# Patient Record
Sex: MALE | NOT HISPANIC OR LATINO | Employment: OTHER | URBAN - METROPOLITAN AREA
[De-identification: names, ages, dates, MRNs, and addresses within clinical notes are randomized per-mention and may not be internally consistent; named-entity substitution may affect disease eponyms.]

---

## 2017-01-20 ENCOUNTER — OFFICE VISIT (OUTPATIENT)
Dept: FAMILY MEDICINE CLINIC | Facility: CLINIC | Age: 72
End: 2017-01-20

## 2017-01-20 VITALS
WEIGHT: 206.5 LBS | SYSTOLIC BLOOD PRESSURE: 128 MMHG | BODY MASS INDEX: 31.3 KG/M2 | HEART RATE: 77 BPM | HEIGHT: 68 IN | TEMPERATURE: 97.9 F | RESPIRATION RATE: 16 BRPM | DIASTOLIC BLOOD PRESSURE: 88 MMHG | OXYGEN SATURATION: 96 %

## 2017-01-20 DIAGNOSIS — R21 RASH AND NONSPECIFIC SKIN ERUPTION: ICD-10-CM

## 2017-01-20 DIAGNOSIS — I15.0 RENOVASCULAR HYPERTENSION: ICD-10-CM

## 2017-01-20 DIAGNOSIS — R26.9 GAIT DISTURBANCE: ICD-10-CM

## 2017-01-20 DIAGNOSIS — J06.9 VIRAL URI: Primary | ICD-10-CM

## 2017-01-20 PROBLEM — I10 BENIGN ESSENTIAL HYPERTENSION: Status: RESOLVED | Noted: 2017-01-20 | Resolved: 2017-01-20

## 2017-01-20 PROBLEM — I10 BENIGN ESSENTIAL HYPERTENSION: Status: ACTIVE | Noted: 2017-01-20

## 2017-01-20 PROCEDURE — 99214 OFFICE O/P EST MOD 30 MIN: CPT | Performed by: FAMILY MEDICINE

## 2017-01-20 RX ORDER — LISINOPRIL 20 MG/1
TABLET ORAL
COMMUNITY
Start: 2017-01-17 | End: 2020-11-06

## 2017-01-20 RX ORDER — METHYLPREDNISOLONE 4 MG/1
TABLET ORAL
Qty: 21 TABLET | Refills: 0 | Status: SHIPPED | OUTPATIENT
Start: 2017-01-20 | End: 2019-04-18

## 2017-01-20 NOTE — MR AVS SNAPSHOT
Jatin Marilin   1/20/2017 11:30 AM   Office Visit    Dept Phone:  [phone]   Encounter #:  [id number]    Provider:  Glen Salas MD   Department:  [facility]                Your Full Care Plan              Today's Medication Changes          These changes are accurate as of: 1/20/17 11:55 AM.  If you have any questions, ask your nurse or doctor.               New Medication(s)Ordered:     MethylPREDNISolone 4 MG tablet   Commonly known as:  MEDROL (JENNA)   Take as directed on package instructions.   Started by:  Glen Salas MD         Medication(s)that have changed:     lisinopril 20 MG tablet   Commonly known as:  PRINIVIL,ZESTRIL   What changed:  Another medication with the same name was removed. Continue taking this medication, and follow the directions you see here.   Changed by:  Glen Salas MD            Where to Get Your Medications      These medications were sent to [address] & [facility] [phone] [phone] [place] [address]     Phone:  [phone]     MethylPREDNISolone 4 MG tablet                  Your Updated Medication List          This list is accurate as of: 1/20/17 11:55 AM.  Always use your most recent med list.                aspirin 81 MG EC tablet       CRESTOR 10 MG tablet   Generic drug:  rosuvastatin       lisinopril 20 MG tablet   Commonly known as:  PRINIVIL,ZESTRIL       MethylPREDNISolone 4 MG tablet   Commonly known as:  MEDROL (JENNA)   Take as directed on package instructions.       omeprazole 40 MG capsule   Commonly known as:  priLOSEC       ZETIA 10 MG tablet   Generic drug:  ezetimibe               You Were Diagnosed With        Codes Comments    Viral URI    -  Primary ICD-10-CM: J06.9, B97.89  ICD-9-CM: 465.9     Gait disturbance     ICD-10-CM: R26.9  ICD-9-CM: 781.2     Renovascular hypertension      "ICD-10-CM: I15.0  ICD-9-CM: 405.91       Instructions     None    Patient Instructions History      Upcoming Appointments     Visit Type Date Time Department    OFFICE VISIT 2017 11:30 AM [facility] Signup     [facility] Ve[facility] allows you to send messages to your doctor, view your test results, renew your prescriptions, schedule appointments, and more. To sign up, go to [employer] and click on the Sign Up Now link in the New User? box. Enter your Veoh Activation Code exactly as it appears below along with the last four digits of your Social Security Number and your Date of Birth () to complete the sign-up process. If you do not sign up before the expiration date, you must request a new code.    Veoh Activation Code: [email or web address]  Expires: 2/3/2017 11:55 AM    If you have questions, you can email [email or web address] or call [phone] to talk to our Veoh staff. Remember, Veoh is NOT to be used for urgent needs. For medical emergencies, dial 911.               Other Info from Your Visit           Allergies     No Known Allergies      Reason for Visit     Hypertension           Vital Signs     Blood Pressure Pulse Temperature Respirations Height Weight    128/88 77 97.9 °F (36.6 °C) (Oral) 16 68\" (172.7 cm) 206 lb 8 oz (93.7 kg)    Oxygen Saturation Body Mass Index Smoking Status             96% 31.4 kg/m2 Never Smoker         Problems and Diagnoses Noted     Heart disease due to blocked artery    Colon polyp    Difficulty swallowing    History of malignant neoplasm of prostate    High cholesterol    Renal artery stenosis    Renovascular hypertension    Viral upper respiratory tract infection    -  Primary    Abnormal walking          No Longer an Issue     Benign essential hypertension    Grief    Subdural hematoma        "

## 2017-01-20 NOTE — PROGRESS NOTES
"Subjective   Jatin Gaston is a 71 y.o. male.     Chief Complaint   Patient presents with   • Hypertension        History of Present Illness    Complaint of cold, allergy-like symptoms for last 2 or 3 days.  Sinuses.  No fever.  No cough.  No sore throat.  He states he's gotten this in the past and a Medrol Dosepak as helped.    Long history of gait disturbance.  For a few years.  He thinks after his subdural hematoma surgery.  He states that he walking down the roberto and all of a sudden he will veer to one side.  No vertigo.  No troubles with coordination.  No dropping things.  No focal weakness.  No muscle stiffness.  No confusion.    His wife passed away almost 2 years ago.  Patient has sadness but no major depression.  No anhedonia.  He has a tractor he recently bought is looking forward to working in the yard.    Rash.  For year or 2.  Anterior chest and back.  Worse with the hot shower.  Itchy at times.  He thinks it is related to stress.  Worse summer and winter.  Especially in the summer with sweat.    The following portions of the patient's history were reviewed and updated as appropriate: allergies, current medications, past family history, past medical history, past social history, past surgical history and problem list.          Review of Systems   Constitutional: Negative for activity change and appetite change.   HENT: Positive for congestion. Negative for sore throat.    Respiratory: Negative for chest tightness and shortness of breath.    Cardiovascular: Negative for chest pain, palpitations and leg swelling.   Skin: Positive for rash.   Neurological: Negative for dizziness, tremors, facial asymmetry, weakness, numbness and headaches.   Psychiatric/Behavioral: Negative for confusion and dysphoric mood.       Objective   Blood pressure 128/88, pulse 77, temperature 97.9 °F (36.6 °C), temperature source Oral, resp. rate 16, height 68\" (172.7 cm), weight 206 lb 8 oz (93.7 kg), SpO2 96 %.  Physical Exam "   Constitutional: He appears well-developed and well-nourished. No distress.   No acute distress.  Nontoxic.   HENT:   Right Ear: Tympanic membrane, external ear and ear canal normal.   Left Ear: Tympanic membrane, external ear and ear canal normal.   Nose: Nose normal.   Mouth/Throat: Oropharynx is clear and moist. No oropharyngeal exudate.   Eyes: Conjunctivae are normal. Right eye exhibits no discharge. Left eye exhibits no discharge. No scleral icterus.   Neck: No thyromegaly present.   Cardiovascular: Normal rate, regular rhythm, normal heart sounds and intact distal pulses.    Pulmonary/Chest: Effort normal and breath sounds normal. No stridor. No respiratory distress. He has no wheezes. He has no rales.   No tachypnea   Musculoskeletal: He exhibits no edema.   Lymphadenopathy:     He has no cervical adenopathy.   Neurological:   Neurological exam.  Pupils equal and reactive to light.  Extra ocular muscle movements intact all directions.  Face symmetric.  Gait unremarkable.  No ataxia.  No dysmetria.  No nystagmus     Skin: Skin is warm and dry. No rash noted.   On the anterior chest and back there is a confluent macular erythematous nondescript rash.  No folliculitis.  No scaling.  No excoriations.  No hives.   Psychiatric: He has a normal mood and affect. His behavior is normal. Judgment and thought content normal.   Nursing note and vitals reviewed.      Assessment/Plan   Jatin was seen today for hypertension.    Diagnoses and all orders for this visit:    Viral URI    Gait disturbance    Renovascular hypertension    Rash and nonspecific skin eruption    Other orders  -     MethylPREDNISolone (MEDROL, JENNA,) 4 MG tablet; Take as directed on package instructions.      Viral URI.  He's responded well in the past of Medrol Dosepak for sinus inflammation.  Refill of this was given ×1.  He will call with fever worsening symptoms.    Gait disturbance.  Nonspecific.  No evidence of Parkinson's.  No evidence of  stroke.  No evidence of vertigo.  Possibly functional, related to his osteoarthritis symptoms.  At this time recommend observation.  If the going on for some months if not years.  He will call with worsening symptoms.    Rash.  Nonspecific.  Possible xerosis.  Possible drug eruption.  It's is chronic.  If the steroid Dosepak helps, patient will call us.  If not improving he will call I will refer him to a dermatologist.    Renovascular hypertension hyperlipidemia.  Also followed by cardiology in lipid clinic.    Follow-up within 1 year for Medicare annual wellness visit.  We'll need Prevnar next visit    Patient's wife  almost 2 years ago.  He continues to grieve.  No major depression.  Minimal alcohol use.

## 2017-01-23 DIAGNOSIS — R21 RASH: Primary | ICD-10-CM

## 2017-03-21 ENCOUNTER — TELEPHONE (OUTPATIENT)
Dept: FAMILY MEDICINE CLINIC | Facility: CLINIC | Age: 72
End: 2017-03-21

## 2018-04-18 ENCOUNTER — OFFICE VISIT (OUTPATIENT)
Dept: FAMILY MEDICINE CLINIC | Facility: CLINIC | Age: 73
End: 2018-04-18

## 2018-04-18 VITALS
OXYGEN SATURATION: 95 % | DIASTOLIC BLOOD PRESSURE: 80 MMHG | SYSTOLIC BLOOD PRESSURE: 124 MMHG | HEART RATE: 65 BPM | WEIGHT: 207 LBS | BODY MASS INDEX: 31.47 KG/M2 | TEMPERATURE: 97.9 F

## 2018-04-18 DIAGNOSIS — R26.9 GAIT DISTURBANCE: ICD-10-CM

## 2018-04-18 DIAGNOSIS — E78.00 PURE HYPERCHOLESTEROLEMIA: Primary | ICD-10-CM

## 2018-04-18 DIAGNOSIS — Z85.46 HISTORY OF MALIGNANT NEOPLASM OF PROSTATE: ICD-10-CM

## 2018-04-18 DIAGNOSIS — I15.0 RENOVASCULAR HYPERTENSION: ICD-10-CM

## 2018-04-18 DIAGNOSIS — K21.00 GASTROESOPHAGEAL REFLUX DISEASE WITH ESOPHAGITIS: ICD-10-CM

## 2018-04-18 PROCEDURE — 99214 OFFICE O/P EST MOD 30 MIN: CPT | Performed by: FAMILY MEDICINE

## 2018-04-18 RX ORDER — MULTIVITAMIN
1 CAPSULE ORAL
COMMUNITY

## 2018-04-18 RX ORDER — NIACINAMIDE, ADENOSINE 5; .1 G/250ML; G/250ML
1 LIQUID TOPICAL
COMMUNITY

## 2018-04-18 RX ORDER — CHLORAL HYDRATE 500 MG
2 CAPSULE ORAL
COMMUNITY

## 2018-04-18 NOTE — PROGRESS NOTES
"Subjective   Jatin Gaston is a 72 y.o. male.     Chief Complaint   Patient presents with   • Balance Issues     stiff all over pt wants psa test        History of Present Illness    Very pleasant 72-year-old male.  I see very infrequently.  He resents today with a number of issues.  He is followed by his cardiologist fairly routinely through [facility].  Hyperlipidemia, hypertension, and reported coronary artery disease.  He continues on the medications prescribed by them.    Long-standing gait disturbance.  He feels like his balance is \"off\".  He has no tremors.  No vertigo.  A friend of his takes meclizine he was wondering if that will work.  He has not fallen.  He has no trouble moving fast.    Prostate cancer.  His urologist reportedly released him from care.  Diagnosed 2011 status post prostatectomy.  He is due for a PSA screening.    GERD.  He continues 40 mg omeprazole.  Followed by GI at [facility].  Patient wanted me to take over care.      The following portions of the patient's history were reviewed and updated as appropriate: allergies, current medications, past family history, past medical history, past social history, past surgical history and problem list.          Review of Systems   Constitutional: Negative.    Respiratory: Negative.    Cardiovascular: Negative.    Musculoskeletal: Negative.    Neurological: Negative for dizziness, light-headedness and headaches.       Objective   Blood pressure 124/80, pulse 65, temperature 97.9 °F (36.6 °C), temperature source Oral, weight 93.9 kg (207 lb), SpO2 95 %.  Physical Exam   Constitutional: He is oriented to person, place, and time. He appears well-developed and well-nourished. No distress.   No acute distress.  Nontoxic.   HENT:   Right Ear: Tympanic membrane, external ear and ear canal normal.   Left Ear: Tympanic membrane, external ear and ear canal normal.   Nose: Nose normal.   Mouth/Throat: Oropharynx is clear and moist. No oropharyngeal exudate.   Eyes: " Conjunctivae are normal. Right eye exhibits no discharge. Left eye exhibits no discharge. No scleral icterus.   Neck: No thyromegaly present.   Cardiovascular: Normal rate, regular rhythm, normal heart sounds and intact distal pulses.    Pulmonary/Chest: Effort normal and breath sounds normal. No stridor. No respiratory distress. He has no wheezes. He has no rales.   No tachypnea   Musculoskeletal: He exhibits no edema.   Lymphadenopathy:     He has no cervical adenopathy.   Neurological: He is alert and oriented to person, place, and time. No cranial nerve deficit. He exhibits normal muscle tone. Coordination normal.   Strength is 5 out of 5 upper and lower extremities.  Gait is overall unremarkable.  No dyskinesia.  No tremor.  No cogwheeling.   Skin: Skin is warm and dry. No rash noted.   Psychiatric: He has a normal mood and affect. His behavior is normal. Judgment and thought content normal.   Nursing note and vitals reviewed.      Assessment/Plan   Jatin was seen today for balance issues.    Diagnoses and all orders for this visit:    Pure hypercholesterolemia    Renovascular hypertension    History of malignant neoplasm of prostate  -     PSA DIAGNOSTIC; Future    Gastroesophageal reflux disease with esophagitis    Gait disturbance      Gait disturbance.  I think this is likely functional and related to musculoskeletal issues and deconditioning.  There is no obvious Parkinson's issue.  It's unchanged since last year.  Recommend increasing exercise and we will watch.    Hypertension.  Stable.    Hyperlipidemia.  I reviewed the [facility] records.  Cholesterol stable.    Remote history prostate cancer.  Diagnostic PSA ordered today.  He has been released from his urologist.  Next    GERD.  I recommend he continue to see his gastroenterologist.  They want to do repeat endoscopy.    Possible depression.  Patient describes decreased interest in pleasurable activities and some depressed mood at times.  His wife   3 years ago.  Lives alone with no family.  I'll see him back in 3 months for recheck including wellness visit.

## 2018-04-19 LAB — PSA SERPL-MCNC: <0.014 NG/ML (ref 0–4)

## 2019-04-18 ENCOUNTER — OFFICE VISIT (OUTPATIENT)
Dept: FAMILY MEDICINE CLINIC | Facility: CLINIC | Age: 74
End: 2019-04-18

## 2019-04-18 VITALS
HEART RATE: 78 BPM | OXYGEN SATURATION: 96 % | BODY MASS INDEX: 31.2 KG/M2 | TEMPERATURE: 97.4 F | DIASTOLIC BLOOD PRESSURE: 82 MMHG | SYSTOLIC BLOOD PRESSURE: 122 MMHG | WEIGHT: 205.9 LBS | HEIGHT: 68 IN

## 2019-04-18 DIAGNOSIS — I15.0 RENOVASCULAR HYPERTENSION: ICD-10-CM

## 2019-04-18 DIAGNOSIS — I70.1 RENAL ARTERY STENOSIS (HCC): ICD-10-CM

## 2019-04-18 DIAGNOSIS — Z00.00 MEDICARE ANNUAL WELLNESS VISIT, SUBSEQUENT: Primary | ICD-10-CM

## 2019-04-18 DIAGNOSIS — Z85.46 HISTORY OF MALIGNANT NEOPLASM OF PROSTATE: ICD-10-CM

## 2019-04-18 DIAGNOSIS — E78.00 PURE HYPERCHOLESTEROLEMIA: ICD-10-CM

## 2019-04-18 DIAGNOSIS — F32.1 CURRENT MODERATE EPISODE OF MAJOR DEPRESSIVE DISORDER, UNSPECIFIED WHETHER RECURRENT (HCC): ICD-10-CM

## 2019-04-18 DIAGNOSIS — I25.10 CHRONIC CORONARY ARTERY DISEASE: ICD-10-CM

## 2019-04-18 LAB — PSA SERPL-MCNC: <0.014 NG/ML (ref 0–4)

## 2019-04-18 PROCEDURE — G0439 PPPS, SUBSEQ VISIT: HCPCS | Performed by: FAMILY MEDICINE

## 2019-04-18 PROCEDURE — 90670 PCV13 VACCINE IM: CPT | Performed by: FAMILY MEDICINE

## 2019-04-18 PROCEDURE — G0009 ADMIN PNEUMOCOCCAL VACCINE: HCPCS | Performed by: FAMILY MEDICINE

## 2019-04-18 PROCEDURE — 99214 OFFICE O/P EST MOD 30 MIN: CPT | Performed by: FAMILY MEDICINE

## 2019-04-18 RX ORDER — AMLODIPINE BESYLATE 5 MG/1
5 TABLET ORAL DAILY
COMMUNITY
Start: 2019-01-18 | End: 2021-02-12 | Stop reason: SDUPTHER

## 2019-04-18 RX ORDER — FLUOXETINE 10 MG/1
10 CAPSULE ORAL DAILY
Qty: 30 CAPSULE | Refills: 5 | Status: SHIPPED | OUTPATIENT
Start: 2019-04-18 | End: 2019-05-30

## 2019-04-18 NOTE — PROGRESS NOTES
QUICK REFERENCE INFORMATION:  The ABCs of the Annual Wellness Visit    Subsequent Medicare Wellness Visit     HEALTH RISK ASSESSMENT    : 1945    Recent Hospitalizations:  No hospitalization(s) within the last year..  ccc      Current Medical Providers:  Patient Care Team:  Glen aSlas MD as PCP - General (Family Medicine)  Aldo Tobin MD as PCP - Claims Attributed        Smoking Status:  Social History     Tobacco Use   Smoking Status Never Smoker   Smokeless Tobacco Former User       Alcohol Consumption:  Social History     Substance and Sexual Activity   Alcohol Use Yes    Comment: occasional       Depression Screen:   PHQ-2/PHQ-9 Depression Screening 2019   Little interest or pleasure in doing things 3   Feeling down, depressed, or hopeless 3   Trouble falling or staying asleep, or sleeping too much 3   Feeling tired or having little energy 3   Poor appetite or overeating 3   Feeling bad about yourself - or that you are a failure or have let yourself or your family down 0   Trouble concentrating on things, such as reading the newspaper or watching television 0   Moving or speaking so slowly that other people could have noticed. Or the opposite - being so fidgety or restless that you have been moving around a lot more than usual 0   Thoughts that you would be better off dead, or of hurting yourself in some way 0   Total Score 15   If you checked off any problems, how difficult have these problems made it for you to do your work, take care of things at home, or get along with other people? Not difficult at all       Health Habits and Functional and Cognitive Screening:  Functional & Cognitive Status 2019   Do you have difficulty preparing food and eating? No   Do you have difficulty bathing yourself, getting dressed or grooming yourself? No   Do you have difficulty using the toilet? No   Do you have difficulty moving around from place to place? Yes   Do you have trouble with  steps or getting out of a bed or a chair? No   In the past year have you fallen or experienced a near fall? No   Current Diet Unhealthy Diet   Dental Exam Up to date   Eye Exam Not up to date   Exercise (times per week) 0 times per week   Current Exercise Activities Include None   Do you need help using the phone?  No   Are you deaf or do you have serious difficulty hearing?  No   Do you need help with transportation? No   Do you need help shopping? No   Do you need help preparing meals?  No   Do you need help with housework?  No   Do you need help with laundry? No   Do you need help taking your medications? No   Do you need help managing money? No   Do you ever drive or ride in a car without wearing a seat belt? No   Have you felt unusual stress, anger or loneliness in the last month? Yes   Who do you live with? Alone   If you need help, do you have trouble finding someone available to you? Yes   Have you been bothered in the last four weeks by sexual problems? No   Do you have difficulty concentrating, remembering or making decisions? No           Does the patient have evidence of cognitive impairment? No    Asiprin use counseling: Taking ASA appropriately as indicated      Recent Lab Results:                   Age-appropriate Screening Schedule:  Refer to the list below for future screening recommendations based on patient's age, sex and/or medical conditions. Orders for these recommended tests are listed in the plan section. The patient has been provided with a written plan.    Health Maintenance   Topic Date Due   • TDAP/TD VACCINES (1 - Tdap) 12/21/1966   • ZOSTER VACCINE (1 of 2) 12/21/1997   • COLONOSCOPY  03/11/2016   • PNEUMOCOCCAL VACCINES (65+ LOW/MEDIUM RISK) (2 of 2 - PCV13) 03/11/2017   • INFLUENZA VACCINE  08/01/2019   • LIPID PANEL  08/20/2019        Subjective   History of Present Illness    Jatin Gaston is a 73 y.o. male who presents for an Annual Wellness Visit.    The following portions of the  "patient's history were reviewed and updated as appropriate: allergies, current medications, past family history, past medical history, past social history, past surgical history and problem list.    Outpatient Medications Prior to Visit   Medication Sig Dispense Refill   • amLODIPine (NORVASC) 5 MG tablet Take 5 mg by mouth Daily.     • aspirin 81 MG EC tablet Take 81 mg by mouth daily.     • Coenzyme Q10 (COQ-10) 150 MG capsule Take 1 capsule by mouth.     • CRESTOR 10 MG tablet      • lisinopril (PRINIVIL,ZESTRIL) 20 MG tablet      • Multiple Vitamin (MULTIVITAMIN) capsule Take 1 capsule by mouth.     • Omega-3 Fatty Acids (FISH OIL) 1000 MG capsule capsule Take 2 capsules by mouth.     • omeprazole (PriLOSEC) 40 MG capsule      • ZETIA 10 MG tablet      • MethylPREDNISolone (MEDROL, JENNA,) 4 MG tablet Take as directed on package instructions. 21 tablet 0     No facility-administered medications prior to visit.        Patient Active Problem List   Diagnosis   • Renovascular hypertension   • Gastroesophageal reflux disease with esophagitis   • Pure hypercholesterolemia   • Colon polyp   • Dysphagia   • Chronic coronary artery disease   • History of malignant neoplasm of prostate   • Renal artery stenosis (CMS/HCC)       Advance Care Planning:  Patient has an advance directive - a copy has not been provided. Have asked the patient to send this to us to add to record    Identification of Risk Factors:  Risk factors include: cardiovascular risk.  Depression.    Review of Systems    Compared to one year ago, the patient feels his physical health is the same.  Compared to one year ago, the patient feels his mental health is the same.    Objective     Physical Exam    Vitals:    04/18/19 1015   BP: 122/82   Pulse: 78   Temp: 97.4 °F (36.3 °C)   TempSrc: Oral   SpO2: 96%   Weight: 93.4 kg (205 lb 14.4 oz)   Height: 172.7 cm (67.99\")       Patient's Body mass index is 31.31 kg/m². BMI is above normal parameters. " Recommendations include: exercise counseling.      Assessment/Plan   Patient Self-Management and Personalized Health Advice  The patient has been provided with information about: exercise, designing advance directives and mental health concerns and preventive services including:   · Advance directive, Pneumococcal vaccine .    Visit Diagnoses:    ICD-10-CM ICD-9-CM   1. Medicare annual wellness visit, subsequent Z00.00 V70.0   2. Renal artery stenosis (CMS/HCC) I70.1 440.1   3. Renovascular hypertension I15.0 405.91   4. Pure hypercholesterolemia E78.00 272.0       No orders of the defined types were placed in this encounter.      Outpatient Encounter Medications as of 4/18/2019   Medication Sig Dispense Refill   • amLODIPine (NORVASC) 5 MG tablet Take 5 mg by mouth Daily.     • aspirin 81 MG EC tablet Take 81 mg by mouth daily.     • Coenzyme Q10 (COQ-10) 150 MG capsule Take 1 capsule by mouth.     • CRESTOR 10 MG tablet      • lisinopril (PRINIVIL,ZESTRIL) 20 MG tablet      • Multiple Vitamin (MULTIVITAMIN) capsule Take 1 capsule by mouth.     • Omega-3 Fatty Acids (FISH OIL) 1000 MG capsule capsule Take 2 capsules by mouth.     • omeprazole (PriLOSEC) 40 MG capsule      • ZETIA 10 MG tablet      • [DISCONTINUED] MethylPREDNISolone (MEDROL, JENNA,) 4 MG tablet Take as directed on package instructions. 21 tablet 0     No facility-administered encounter medications on file as of 4/18/2019.        Reviewed use of high risk medication in the elderly: yes  Reviewed for potential of harmful drug interactions in the elderly: yes    Follow Up:  No Follow-up on file.     An After Visit Summary and PPPS with all of these plans were given to the patient.

## 2019-04-18 NOTE — PROGRESS NOTES
"Subjective   Jatin Gaston is a 73 y.o. male.     Chief Complaint   Patient presents with   • Medicare Wellness-subsequent     pt is not fasting   • Depression        History of Present Illness    Depression.  Symptoms for a number of months.  PHQ 9 score 13 today.  Mild to moderate symptoms.  No suicidal ideation.  Patient states that depression and suicide runs in the family.  He is  now about 3 or 4 years.  Wakes up every morning missing his wife.  Otherwise he is active.  He enjoys being with his cats.  He states he does not want to hurt himself or kill himself.  He is interested in treatment.    Renal vascular hypertension with renal artery stenosis.  Previously treated.  He continues amlodipine and lisinopril prescribed by his cardiologist.  Next    Hyperlipidemia.  He continues Crestor and Zetia.    Chronic coronary artery disease.  Followed by cardiology.      The following portions of the patient's history were reviewed and updated as appropriate: allergies, current medications, past family history, past medical history, past social history, past surgical history and problem list.          Review of Systems   Constitutional: Negative.    Respiratory: Negative.    Cardiovascular: Negative.    Musculoskeletal: Negative.    Psychiatric/Behavioral: Positive for dysphoric mood. Negative for suicidal ideas.       Objective   Blood pressure 122/82, pulse 78, temperature 97.4 °F (36.3 °C), temperature source Oral, height 172.7 cm (67.99\"), weight 93.4 kg (205 lb 14.4 oz), SpO2 96 %.  Physical Exam   Constitutional: He appears well-developed and well-nourished. No distress.   Neck: No thyromegaly present.   Cardiovascular: Normal rate, regular rhythm, normal heart sounds and intact distal pulses.   Pulmonary/Chest: Effort normal and breath sounds normal.   Musculoskeletal: He exhibits no edema.   Skin: Skin is warm and dry.   Psychiatric: Judgment and thought content normal.   Mood is depressed   Nursing note " and vitals reviewed.      Assessment/Plan   Jatin was seen today for medicare wellness-subsequent and depression.    Diagnoses and all orders for this visit:    Medicare annual wellness visit, subsequent    Renal artery stenosis (CMS/HCC)    Renovascular hypertension    Pure hypercholesterolemia    Chronic coronary artery disease    History of malignant neoplasm of prostate  -     PSA DIAGNOSTIC    Current moderate episode of major depressive disorder, unspecified whether recurrent (CMS/HCC)    Other orders  -     Pneumococcal Conjugate Vaccine 13-Valent All  -     FLUoxetine (PROzac) 10 MG capsule; Take 1 capsule by mouth Daily.      Major depression.  Counseling given.  Start fluoxetine 10 mg a day.  Side effects given.  Return in 6 weeks for follow-up.    Hypertension, history of renal artery stenosis, chronic coronary disease.  Also followed by cardiology.    Hyperlipidemia.  Continue Crestor and Zetia.    Prostate cancer history.  Released by urology.  They want us to follow PSA levels.  Undetected last year.  Rechecking today.  No urological symptoms.

## 2019-05-30 ENCOUNTER — OFFICE VISIT (OUTPATIENT)
Dept: FAMILY MEDICINE CLINIC | Facility: CLINIC | Age: 74
End: 2019-05-30

## 2019-05-30 VITALS
TEMPERATURE: 97.2 F | WEIGHT: 203.2 LBS | DIASTOLIC BLOOD PRESSURE: 82 MMHG | SYSTOLIC BLOOD PRESSURE: 122 MMHG | OXYGEN SATURATION: 96 % | BODY MASS INDEX: 30.8 KG/M2 | HEART RATE: 68 BPM | HEIGHT: 68 IN

## 2019-05-30 DIAGNOSIS — F32.1 CURRENT MODERATE EPISODE OF MAJOR DEPRESSIVE DISORDER, UNSPECIFIED WHETHER RECURRENT (HCC): Primary | ICD-10-CM

## 2019-05-30 PROCEDURE — 99213 OFFICE O/P EST LOW 20 MIN: CPT | Performed by: FAMILY MEDICINE

## 2019-05-30 RX ORDER — FLUOXETINE 10 MG/1
10 CAPSULE ORAL DAILY
Qty: 90 CAPSULE | Refills: 1 | Status: SHIPPED | OUTPATIENT
Start: 2019-05-30 | End: 2019-11-11 | Stop reason: SDUPTHER

## 2019-05-30 RX ORDER — POLYETHYLENE GLYCOL-3350 AND ELECTROLYTES 236; 6.74; 5.86; 2.97; 22.74 G/274.31G; G/274.31G; G/274.31G; G/274.31G; G/274.31G
POWDER, FOR SOLUTION ORAL
COMMUNITY
Start: 2019-04-18 | End: 2021-11-15

## 2019-05-30 NOTE — PROGRESS NOTES
"Subjective   Jatin Gaston is a 73 y.o. male.     Chief Complaint   Patient presents with   • Hyperlipidemia     follow up   • Hypertension   • Depression        History of Present Illness    Follow-up depression.  The PHQ 9 score is improved.  Was 13.  Now 5.  He feels medication relaxes him.  He still feels depressed at times.  He is more physically active.  He got his colonoscopy done.  Also had his EGD done.  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  His symptoms are not that difficult at this time.  No side effects from the fluoxetine 10 mg daily      The following portions of the patient's history were reviewed and updated as appropriate: allergies, current medications, past family history, past medical history, past social history, past surgical history and problem list.          Review of Systems   Constitutional: Negative.    Respiratory: Negative.    Cardiovascular: Negative.    Musculoskeletal: Negative.    Psychiatric/Behavioral: Positive for dysphoric mood.       Objective   Blood pressure 122/82, pulse 68, temperature 97.2 °F (36.2 °C), temperature source Oral, height 172.7 cm (67.99\"), weight 92.2 kg (203 lb 3.2 oz), SpO2 96 %.  Physical Exam   Constitutional: He appears well-developed and well-nourished. No distress.   Neck: No thyromegaly present.   Cardiovascular: Normal rate, regular rhythm, normal heart sounds and intact distal pulses.   Pulmonary/Chest: Effort normal and breath sounds normal.   Musculoskeletal: He exhibits no edema.   Skin: Skin is warm and dry.   Psychiatric: His behavior is normal. Judgment and thought content normal.   Affect is more bright.  Mood improved.  No suicidal ideation.   Nursing note and vitals reviewed.      Assessment/Plan   Jatin was seen today for hyperlipidemia, hypertension and depression.    Diagnoses and all orders for this visit:    Current moderate episode of major depressive disorder, unspecified whether recurrent " (CMS/[facility])    Other orders  -     FLUoxetine (PROzac) 10 MG capsule; Take 1 capsule by mouth Daily.        Major depression.  Improving.  Continue fluoxetine 10 mg a day for the next 6 to 12 months.  Will reassess.  Counseling given today.  He will continue to be physically active.  I will see him in 6 months.

## 2019-11-11 RX ORDER — FLUOXETINE 10 MG/1
CAPSULE ORAL
Qty: 90 CAPSULE | Refills: 1 | Status: SHIPPED | OUTPATIENT
Start: 2019-11-11 | End: 2020-02-05 | Stop reason: SDUPTHER

## 2020-02-05 ENCOUNTER — OFFICE VISIT (OUTPATIENT)
Dept: FAMILY MEDICINE CLINIC | Facility: CLINIC | Age: 75
End: 2020-02-05

## 2020-02-05 VITALS
WEIGHT: 207.8 LBS | SYSTOLIC BLOOD PRESSURE: 147 MMHG | TEMPERATURE: 96.9 F | OXYGEN SATURATION: 97 % | DIASTOLIC BLOOD PRESSURE: 82 MMHG | HEIGHT: 68 IN | BODY MASS INDEX: 31.49 KG/M2 | HEART RATE: 64 BPM

## 2020-02-05 DIAGNOSIS — E78.00 PURE HYPERCHOLESTEROLEMIA: Primary | ICD-10-CM

## 2020-02-05 DIAGNOSIS — I25.10 CHRONIC CORONARY ARTERY DISEASE: ICD-10-CM

## 2020-02-05 DIAGNOSIS — M10.079 IDIOPATHIC GOUT INVOLVING TOE, UNSPECIFIED CHRONICITY, UNSPECIFIED LATERALITY: ICD-10-CM

## 2020-02-05 DIAGNOSIS — F33.41 RECURRENT MAJOR DEPRESSIVE DISORDER, IN PARTIAL REMISSION (HCC): ICD-10-CM

## 2020-02-05 DIAGNOSIS — Z85.46 HISTORY OF MALIGNANT NEOPLASM OF PROSTATE: ICD-10-CM

## 2020-02-05 DIAGNOSIS — I15.0 RENOVASCULAR HYPERTENSION: ICD-10-CM

## 2020-02-05 DIAGNOSIS — I70.1 RENAL ARTERY STENOSIS (HCC): ICD-10-CM

## 2020-02-05 PROCEDURE — 99214 OFFICE O/P EST MOD 30 MIN: CPT | Performed by: FAMILY MEDICINE

## 2020-02-05 RX ORDER — FLUOXETINE 10 MG/1
10 CAPSULE ORAL DAILY
Qty: 90 CAPSULE | Refills: 1 | Status: SHIPPED | OUTPATIENT
Start: 2020-02-05 | End: 2020-07-24

## 2020-02-05 NOTE — PROGRESS NOTES
"Subjective   Jatin Gaston is a 74 y.o. male.     Depression (pt is fasting)    History of Present Illness    Hypertension follow up. Doing well with current medication which he is taking as directed. No known high or low blood pressure episodes. No cardiovascular or neurological symptoms. Today's BP: 147/82.      Chronic coronary artery disease.  Asymptomatic.  Continues aspirin.  Followed by cardiology.    Hyperlipidemia follow up. He is taking statin medication without complaint. No myopathy symptoms.  Continues Crestor and Zetia.  We have no lipid panel on file.  Prescribed by cardiology.    Major depression.  In remission mostly.  PHQ 9 score of 5, not difficult, natalie 7 score of 0.  Continues fluoxetine 10 mg day without side effect.  He would like a refill.    Patient believes he had a flare of gout.  Right great toe.  He was moving a lot of wood and doing other work and then that morning it started hurting the next day.  Redness.  He laid off it.  Got better.  This is happened before.  Maybe once or twice a year.    History of prostate cancer.  He is coming due for his diagnostic PSA level.    The following portions of the patient's history were reviewed and updated as appropriate: allergies, current medications, past family history, past medical history, past social history, past surgical history and problem list.      Review of Systems   Constitutional: Negative.    Respiratory: Negative.    Cardiovascular: Negative.    Musculoskeletal: Positive for joint swelling.       Objective   Blood pressure 147/82, pulse 64, temperature 96.9 °F (36.1 °C), temperature source Oral, height 172.7 cm (67.99\"), weight 94.3 kg (207 lb 12.8 oz), SpO2 97 %.  Physical Exam   Constitutional: He appears well-developed and well-nourished. No distress.   Neck: No thyromegaly present.   Cardiovascular: Normal rate, regular rhythm, normal heart sounds and intact distal pulses.   Pulmonary/Chest: Effort normal and breath sounds normal. "   Abdominal: Soft. Bowel sounds are normal. He exhibits no distension and no mass. There is no tenderness. There is no guarding.   Abdomen is obese.  History of vascular disease, coronary disease and renal artery stenosis.  Never smoker.  No family history abdominal aortic aneurysm.  Limited point-of-care ultrasound of the abdominal aorta was performed by me.  The proximal aorta was difficult to visualize due to overlying bowel gas.  Distal aorta visualized to the epigastrium.  Transverse diameter 2.03 cm longitudinal diameter 2.06 cm.  No evidence of aneurysmal dilatation distal aorta.  At this time no absolute indication for formal study.  Does not meet Medicare criteria.   Musculoskeletal: He exhibits no edema.   Skin: Skin is warm and dry.   Psychiatric: He has a normal mood and affect. His behavior is normal. Judgment and thought content normal.   Nursing note and vitals reviewed.      Assessment/Plan   Jatin was seen today for depression.    Diagnoses and all orders for this visit:    Pure hypercholesterolemia  -     Comprehensive Metabolic Panel  -     Lipid Panel    Renovascular hypertension  -     Comprehensive Metabolic Panel    Recurrent major depressive disorder, in partial remission (CMS/HCC)    Renal artery stenosis (CMS/HCC)    History of malignant neoplasm of prostate  -     PSA DIAGNOSTIC    Idiopathic gout involving toe, unspecified chronicity, unspecified laterality  -     Uric acid    Chronic coronary artery disease    Other orders  -     FLUoxetine (PROzac) 10 MG capsule; Take 1 capsule by mouth Daily.      Hyperlipidemia.  Known coronary disease.  Continue Zetia and Crestor.  Checking labs today.    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  2003.  Left kidney.  Blood pressures running well.  Continue current medication.    Major depression.  Mostly in remission.  Continue fluoxetine.  Refill given.  Counseling given.  See me in 6 months recheck and annual wellness  visit.    History of prostate cancer.  Rechecking diagnostic PSA.    Gout.  Likely reason exacerbation.  Asymptomatic now.  Once or twice a year.  Holding off allopurinol.  He will see us with recurrent symptoms.  Checking uric acid today.

## 2020-02-06 LAB
ALBUMIN SERPL-MCNC: 4.5 G/DL (ref 3.5–5.2)
ALBUMIN/GLOB SERPL: 2 G/DL
ALP SERPL-CCNC: 82 U/L (ref 39–117)
ALT SERPL-CCNC: 17 U/L (ref 1–41)
AST SERPL-CCNC: 22 U/L (ref 1–40)
BILIRUB SERPL-MCNC: 0.4 MG/DL (ref 0.2–1.2)
BUN SERPL-MCNC: 16 MG/DL (ref 8–23)
BUN/CREAT SERPL: 11.4 (ref 7–25)
CALCIUM SERPL-MCNC: 9.1 MG/DL (ref 8.6–10.5)
CHLORIDE SERPL-SCNC: 102 MMOL/L (ref 98–107)
CHOLEST SERPL-MCNC: 142 MG/DL (ref 0–200)
CO2 SERPL-SCNC: 25.8 MMOL/L (ref 22–29)
CREAT SERPL-MCNC: 1.4 MG/DL (ref 0.76–1.27)
GLOBULIN SER CALC-MCNC: 2.2 GM/DL
GLUCOSE SERPL-MCNC: 97 MG/DL (ref 65–99)
HDLC SERPL-MCNC: 42 MG/DL (ref 40–60)
LDLC SERPL CALC-MCNC: 67 MG/DL (ref 0–100)
POTASSIUM SERPL-SCNC: 4.5 MMOL/L (ref 3.5–5.2)
PROT SERPL-MCNC: 6.7 G/DL (ref 6–8.5)
PSA SERPL-MCNC: <0.014 NG/ML (ref 0–4)
SODIUM SERPL-SCNC: 141 MMOL/L (ref 136–145)
TRIGL SERPL-MCNC: 165 MG/DL (ref 0–150)
URATE SERPL-MCNC: 6.5 MG/DL (ref 3.4–7)
VLDLC SERPL CALC-MCNC: 33 MG/DL

## 2020-02-06 RX ORDER — ALLOPURINOL 100 MG/1
100 TABLET ORAL DAILY
Qty: 30 TABLET | Refills: 0 | Status: SHIPPED | OUTPATIENT
Start: 2020-02-06

## 2020-02-06 NOTE — PROGRESS NOTES
The lab work overall looks good.  The PSA is not detected.  The uric acid level is on the high normal side, consistent consistent with his recent symptoms of gout.  If he has recurrent gout he is going to let us know.  Cholesterol looks good. fine.

## 2020-07-24 RX ORDER — FLUOXETINE 10 MG/1
CAPSULE ORAL
Qty: 90 CAPSULE | Refills: 1 | Status: SHIPPED | OUTPATIENT
Start: 2020-07-24 | End: 2020-08-07

## 2020-08-04 DIAGNOSIS — I15.0 RENOVASCULAR HYPERTENSION: Primary | ICD-10-CM

## 2020-08-04 DIAGNOSIS — E78.00 PURE HYPERCHOLESTEROLEMIA: ICD-10-CM

## 2020-08-05 ENCOUNTER — LAB (OUTPATIENT)
Dept: LAB | Facility: HOSPITAL | Age: 75
End: 2020-08-05

## 2020-08-05 LAB
ALBUMIN SERPL-MCNC: 4.3 G/DL (ref 3.5–5.2)
ALBUMIN/GLOB SERPL: 1.9 G/DL
ALP SERPL-CCNC: 74 U/L (ref 39–117)
ALT SERPL W P-5'-P-CCNC: 21 U/L (ref 1–41)
ANION GAP SERPL CALCULATED.3IONS-SCNC: 7.9 MMOL/L (ref 5–15)
AST SERPL-CCNC: 20 U/L (ref 1–40)
BILIRUB SERPL-MCNC: 0.4 MG/DL (ref 0–1.2)
BUN SERPL-MCNC: 18 MG/DL (ref 8–23)
BUN/CREAT SERPL: 11 (ref 7–25)
CALCIUM SPEC-SCNC: 9.2 MG/DL (ref 8.6–10.5)
CHLORIDE SERPL-SCNC: 105 MMOL/L (ref 98–107)
CHOLEST SERPL-MCNC: 137 MG/DL (ref 0–200)
CO2 SERPL-SCNC: 28.1 MMOL/L (ref 22–29)
CREAT SERPL-MCNC: 1.64 MG/DL (ref 0.76–1.27)
GFR SERPL CREATININE-BSD FRML MDRD: 42 ML/MIN/1.73
GFR SERPL CREATININE-BSD FRML MDRD: 50 ML/MIN/1.73
GLOBULIN UR ELPH-MCNC: 2.3 GM/DL
GLUCOSE SERPL-MCNC: 120 MG/DL (ref 65–99)
HDLC SERPL-MCNC: 42 MG/DL (ref 40–60)
LDLC SERPL CALC-MCNC: 65 MG/DL (ref 0–100)
LDLC/HDLC SERPL: 1.55 {RATIO}
POTASSIUM SERPL-SCNC: 4.7 MMOL/L (ref 3.5–5.2)
PROT SERPL-MCNC: 6.6 G/DL (ref 6–8.5)
SODIUM SERPL-SCNC: 141 MMOL/L (ref 136–145)
TRIGL SERPL-MCNC: 149 MG/DL (ref 0–150)
VLDLC SERPL-MCNC: 29.8 MG/DL (ref 5–40)

## 2020-08-05 PROCEDURE — 80053 COMPREHEN METABOLIC PANEL: CPT | Performed by: FAMILY MEDICINE

## 2020-08-05 PROCEDURE — 80061 LIPID PANEL: CPT | Performed by: FAMILY MEDICINE

## 2020-08-05 PROCEDURE — 36415 COLL VENOUS BLD VENIPUNCTURE: CPT | Performed by: FAMILY MEDICINE

## 2020-08-07 ENCOUNTER — OFFICE VISIT (OUTPATIENT)
Dept: FAMILY MEDICINE CLINIC | Facility: CLINIC | Age: 75
End: 2020-08-07

## 2020-08-07 VITALS
WEIGHT: 205 LBS | OXYGEN SATURATION: 95 % | BODY MASS INDEX: 31.07 KG/M2 | DIASTOLIC BLOOD PRESSURE: 86 MMHG | HEIGHT: 68 IN | SYSTOLIC BLOOD PRESSURE: 136 MMHG | TEMPERATURE: 97.1 F | HEART RATE: 76 BPM

## 2020-08-07 DIAGNOSIS — Z00.00 MEDICARE ANNUAL WELLNESS VISIT, SUBSEQUENT: Primary | ICD-10-CM

## 2020-08-07 DIAGNOSIS — E78.00 PURE HYPERCHOLESTEROLEMIA: ICD-10-CM

## 2020-08-07 DIAGNOSIS — I25.10 CHRONIC CORONARY ARTERY DISEASE: ICD-10-CM

## 2020-08-07 DIAGNOSIS — F33.41 RECURRENT MAJOR DEPRESSIVE DISORDER, IN PARTIAL REMISSION (HCC): ICD-10-CM

## 2020-08-07 DIAGNOSIS — I70.1 RENAL ARTERY STENOSIS (HCC): ICD-10-CM

## 2020-08-07 DIAGNOSIS — R73.01 IMPAIRED FASTING GLUCOSE: ICD-10-CM

## 2020-08-07 DIAGNOSIS — I15.0 RENOVASCULAR HYPERTENSION: ICD-10-CM

## 2020-08-07 LAB
BUN SERPL-MCNC: 28 MG/DL (ref 8–23)
BUN/CREAT SERPL: 17 (ref 7–25)
CALCIUM SERPL-MCNC: 9 MG/DL (ref 8.6–10.5)
CHLORIDE SERPL-SCNC: 103 MMOL/L (ref 98–107)
CO2 SERPL-SCNC: 23.7 MMOL/L (ref 22–29)
CREAT SERPL-MCNC: 1.65 MG/DL (ref 0.76–1.27)
GLUCOSE SERPL-MCNC: 125 MG/DL (ref 65–99)
HBA1C MFR BLD: 6.1 % (ref 4.8–5.6)
POTASSIUM SERPL-SCNC: 4.6 MMOL/L (ref 3.5–5.2)
SODIUM SERPL-SCNC: 140 MMOL/L (ref 136–145)

## 2020-08-07 PROCEDURE — 99214 OFFICE O/P EST MOD 30 MIN: CPT | Performed by: FAMILY MEDICINE

## 2020-08-07 PROCEDURE — G0439 PPPS, SUBSEQ VISIT: HCPCS | Performed by: FAMILY MEDICINE

## 2020-08-07 RX ORDER — FLUOXETINE HYDROCHLORIDE 20 MG/1
20 CAPSULE ORAL DAILY
Qty: 90 CAPSULE | Refills: 1 | Status: SHIPPED | OUTPATIENT
Start: 2020-08-07 | End: 2021-04-06

## 2020-08-07 NOTE — PROGRESS NOTES
"Subjective   Jatin Gaston is a 74 y.o. male.     Medicare Wellness-subsequent (AWV)    History of Present Illness       Major depression.  Exacerbation.  Stressors.  Social isolation.  Had his wife  5 years ago.  His brother-in-law  recently.  He has had decreased interest in pleasurable activities.  He is not mowing the lawn as much as he used to.  He has had some vague suicidal ideation but no specific plan.  He does take cierra in his cats.  He does take a trip down to his property in [place].    Hypertension follow up. Doing well with current medication which he is taking as directed. No known high or low blood pressure episodes. No cardiovascular or neurological symptoms. Today's BP: 136/86.      Hyperlipidemia follow up. He is taking statin medication without complaint. No myopathy symptoms.  Known coronary artery disease.    Lab Results   Component Value Date    CHOL 137 2020    CHLPL 142 2020    TRIG 149 2020    HDL 42 2020    LDL 65 2020     Renal artery stenosis left kidney.  Stent .  Atrophic left kidney.  Recent creatinine 1-1.6 with a GFR in the 40s.  He did not have much water the morning of the blood work.  His blood sugar is also elevated at 120.  He states his been eating a lot of candy lately.    The following portions of the patient's history were reviewed and updated as appropriate: allergies, current medications, past family history, past medical history, past social history, past surgical history and problem list.      Review of Systems   Constitutional: Negative.    Respiratory: Negative.    Cardiovascular: Negative.    Musculoskeletal: Negative.    Psychiatric/Behavioral: Positive for dysphoric mood. The patient is nervous/anxious.        Objective   Blood pressure 136/86, pulse 76, temperature 97.1 °F (36.2 °C), height 172.7 cm (68\"), weight 93 kg (205 lb), SpO2 95 %.  Physical Exam   Constitutional: He is oriented to person, place, and time. He " appears well-nourished. No distress.   HENT:   Head: Atraumatic.   Cardiovascular: Normal rate and regular rhythm.   Pulmonary/Chest: Effort normal and breath sounds normal.   Abdominal: Soft. He exhibits no distension. There is no tenderness.   Musculoskeletal: Normal range of motion.   Neurological: He is alert and oriented to person, place, and time. He exhibits normal muscle tone. Coordination normal.   Psychiatric:   Affect somewhat flat.  Mood depressed.     Quick look point-of-care ultrasound.  Measuring kidney size differences.  Right kidney is 8.9 cm in the longitudinal direction.  The left kidney is much smaller 4.6 centimeters in the longitudinal direction.  No hydronephrosis seen.    Assessment/Plan   Jatin was seen today for medicare wellness-subsequent.    Diagnoses and all orders for this visit:    Medicare annual wellness visit, subsequent    Renal artery stenosis (CMS/[facility])    Pure hypercholesterolemia    Chronic coronary artery disease    Renovascular hypertension    Recurrent major depressive disorder, in partial remission (CMS/[facility])    Impaired fasting glucose  -     Hemoglobin A1c  -     Basic Metabolic Panel    Other orders  -     FLUoxetine (PROzac) 20 MG capsule; Take 1 capsule by mouth Daily.      Renal artery stenosis.  Slight increase in creatinine.  Possibly from dehydration.  Rechecking today.  He has a known atrophic left kidney.    Chronic coronary disease.  No recurrent symptoms.    Hypertension.  Controlled.    Major depression.  In partial remission with recurrence.  Recommending increasing fluoxetine from 10 mg up to 20 mg daily.  Counseling given.  Patient has had some vague suicidal ideation but no definitive plan.  He agrees to contact help or suicide prevention hotline with any suicidal plan.    Impaired fasting glucose.  Checking A1c.  I will see him back in 3 months for recheck sooner as needed.  He understands to call if mood is getting worse.

## 2020-08-07 NOTE — PROGRESS NOTES
The ABCs of the Annual Wellness Visit  Subsequent Medicare Wellness Visit    Chief Complaint   Patient presents with   • Medicare Wellness-subsequent     AWV       Subjective   History of Present Illness:  Jatin Gaston is a 74 y.o. male who presents for a Subsequent Medicare Wellness Visit.    HEALTH RISK ASSESSMENT    Recent Hospitalizations:  No hospitalization(s) within the last year.    Current Medical Providers:  Patient Care Team:  Glen Salas MD as PCP - General (Family Medicine)  Glen Salas MD as PCP - Claims Attributed    Smoking Status:  Social History     Tobacco Use   Smoking Status Never Smoker   Smokeless Tobacco Former User       Alcohol Consumption:  Social History     Substance and Sexual Activity   Alcohol Use Yes    Comment: occasional       Depression Screen:   PHQ-2/PHQ-9 Depression Screening 8/7/2020   Little interest or pleasure in doing things 3   Feeling down, depressed, or hopeless 2   Trouble falling or staying asleep, or sleeping too much 0   Feeling tired or having little energy 3   Poor appetite or overeating 0   Feeling bad about yourself - or that you are a failure or have let yourself or your family down 0   Trouble concentrating on things, such as reading the newspaper or watching television 1   Moving or speaking so slowly that other people could have noticed. Or the opposite - being so fidgety or restless that you have been moving around a lot more than usual 0   Thoughts that you would be better off dead, or of hurting yourself in some way 1   Total Score 10   If you checked off any problems, how difficult have these problems made it for you to do your work, take care of things at home, or get along with other people? Very difficult       Fall Risk Screen:  STEADI Fall Risk Assessment was completed, and patient is at LOW risk for falls.Assessment completed on:8/7/2020    Health Habits and Functional and Cognitive Screening:  Functional & Cognitive Status 8/7/2020   Do  you have difficulty preparing food and eating? No   Do you have difficulty bathing yourself, getting dressed or grooming yourself? No   Do you have difficulty using the toilet? No   Do you have difficulty moving around from place to place? No   Do you have trouble with steps or getting out of a bed or a chair? No   Current Diet Frequent Junk Food   Dental Exam Not up to date   Eye Exam Not up to date   Exercise (times per week) 2 times per week   Current Exercise Activities Include Walking   Do you need help using the phone?  No   Are you deaf or do you have serious difficulty hearing?  Yes   Do you need help with transportation? No   Do you need help shopping? No   Do you need help preparing meals?  No   Do you need help with housework?  No   Do you need help with laundry? No   Do you need help taking your medications? No   Do you need help managing money? No   Do you ever drive or ride in a car without wearing a seat belt? No   Have you felt unusual stress, anger or loneliness in the last month? No   Who do you live with? Alone   If you need help, do you have trouble finding someone available to you? No   Have you been bothered in the last four weeks by sexual problems? No   Do you have difficulty concentrating, remembering or making decisions? No         Does the patient have evidence of cognitive impairment? No    Asprin use counseling:Taking ASA appropriately as indicated    Age-appropriate Screening Schedule:  Refer to the list below for future screening recommendations based on patient's age, sex and/or medical conditions. Orders for these recommended tests are listed in the plan section. The patient has been provided with a written plan.    Health Maintenance   Topic Date Due   • TDAP/TD VACCINES (1 - Tdap) 12/21/1956   • ZOSTER VACCINE (2 of 3) 05/15/2016   • INFLUENZA VACCINE  08/01/2020   • LIPID PANEL  08/05/2021   • COLONOSCOPY  05/09/2029          The following portions of the patient's history were  reviewed and updated as appropriate: allergies, current medications, past family history, past medical history, past social history, past surgical history and problem list.    Outpatient Medications Prior to Visit   Medication Sig Dispense Refill   • amLODIPine (NORVASC) 5 MG tablet Take 5 mg by mouth Daily.     • aspirin 81 MG EC tablet Take 81 mg by mouth daily.     • Cholecalciferol (D 1000) 25 MCG (1000 UT) capsule Take 1,000 Units by mouth Daily.     • Coenzyme Q10 (COQ-10) 150 MG capsule Take 1 capsule by mouth.     • CRESTOR 10 MG tablet      • FLUoxetine (PROzac) 10 MG capsule TAKE 1 CAPSULE DAILY 90 capsule 1   • GAVILYTE-G 236 g solution      • lisinopril (PRINIVIL,ZESTRIL) 20 MG tablet      • Multiple Vitamin (MULTIVITAMIN) capsule Take 1 capsule by mouth.     • Omega-3 Fatty Acids (FISH OIL) 1000 MG capsule capsule Take 2 capsules by mouth.     • omeprazole (PriLOSEC) 40 MG capsule      • ZETIA 10 MG tablet      • allopurinol (ZYLOPRIM) 100 MG tablet Take 1 tablet by mouth Daily. 30 tablet 0     No facility-administered medications prior to visit.        Patient Active Problem List   Diagnosis   • Renovascular hypertension   • Gastroesophageal reflux disease with esophagitis   • Pure hypercholesterolemia   • Colon polyp   • Dysphagia   • Chronic coronary artery disease   • History of malignant neoplasm of prostate   • Renal artery stenosis (CMS/HCC)   • Recurrent major depressive disorder, in partial remission (CMS/HCC)   • Idiopathic gout involving toe       Advanced Care Planning:  ACP discussion was held with the patient during this visit. Patient has an advance directive in EMR which is still valid.     Review of Systems    Compared to one year ago, the patient feels his physical health is the same.  Compared to one year ago, the patient feels his mental health is the same.    Reviewed chart for potential of high risk medication in the elderly: yes  Reviewed chart for potential of harmful drug  "interactions in the elderly:yes    Objective         Vitals:    08/07/20 0940   BP: 136/86   Pulse: 76   Temp: 97.1 °F (36.2 °C)   SpO2: 95%   Weight: 93 kg (205 lb)   Height: 172.7 cm (68\")       Body mass index is 31.17 kg/m².  Discussed the patient's BMI with him. The BMI Is elevated and was addressed.    Physical Exam    Lab Results   Component Value Date    TRIG 149 08/05/2020    HDL 42 08/05/2020    LDL 65 08/05/2020    VLDL 29.8 08/05/2020        Assessment/Plan   Medicare Risks and Personalized Health Plan  CMS Preventative Services Quick Reference  Advance Directive Discussion  Immunizations Discussed/Encouraged (specific immunizations; Shingrix )   Depression risk addressed    The above risks/problems have been discussed with the patient.  Pertinent information has been shared with the patient in the After Visit Summary.  Follow up plans and orders are seen below in the Assessment/Plan Section.    Diagnoses and all orders for this visit:    1. Medicare annual wellness visit, subsequent (Primary)    2. Renal artery stenosis (CMS/[facility])    3. Pure hypercholesterolemia    4. Chronic coronary artery disease    5. Renovascular hypertension    6. Recurrent major depressive disorder, in partial remission (CMS/[facility])      Follow Up:  No follow-ups on file.     An After Visit Summary and PPPS were given to the patient.             "

## 2020-08-10 DIAGNOSIS — I15.0 RENOVASCULAR HYPERTENSION: ICD-10-CM

## 2020-08-10 DIAGNOSIS — R73.01 IMPAIRED FASTING GLUCOSE: Primary | ICD-10-CM

## 2020-08-10 NOTE — PROGRESS NOTES
Please call patient.  The A1c average blood sugar test is in the prediabetic range.  Recommend decreasing the sweets if possible.  Also increased exercise.  The kidney function remains mildly diminished.  We will continue to monitor.  Needs A1c and CMP prior to next visit.

## 2020-11-02 ENCOUNTER — RESULTS ENCOUNTER (OUTPATIENT)
Dept: FAMILY MEDICINE CLINIC | Facility: CLINIC | Age: 75
End: 2020-11-02

## 2020-11-02 DIAGNOSIS — R73.01 IMPAIRED FASTING GLUCOSE: ICD-10-CM

## 2020-11-02 DIAGNOSIS — I15.0 RENOVASCULAR HYPERTENSION: ICD-10-CM

## 2020-11-04 LAB
ALBUMIN SERPL-MCNC: 4.6 G/DL (ref 3.5–5.2)
ALBUMIN/GLOB SERPL: 2.6 G/DL
ALP SERPL-CCNC: 84 U/L (ref 39–117)
ALT SERPL-CCNC: 21 U/L (ref 1–41)
AST SERPL-CCNC: 21 U/L (ref 1–40)
BILIRUB SERPL-MCNC: 0.4 MG/DL (ref 0–1.2)
BUN SERPL-MCNC: 16 MG/DL (ref 8–23)
BUN/CREAT SERPL: 9.8 (ref 7–25)
CALCIUM SERPL-MCNC: 9.4 MG/DL (ref 8.6–10.5)
CHLORIDE SERPL-SCNC: 101 MMOL/L (ref 98–107)
CO2 SERPL-SCNC: 29.8 MMOL/L (ref 22–29)
CREAT SERPL-MCNC: 1.63 MG/DL (ref 0.76–1.27)
GLOBULIN SER CALC-MCNC: 1.8 GM/DL
GLUCOSE SERPL-MCNC: 106 MG/DL (ref 65–99)
HBA1C MFR BLD: 5.8 % (ref 4.8–5.6)
POTASSIUM SERPL-SCNC: 4.7 MMOL/L (ref 3.5–5.2)
PROT SERPL-MCNC: 6.4 G/DL (ref 6–8.5)
SODIUM SERPL-SCNC: 138 MMOL/L (ref 136–145)

## 2020-11-04 NOTE — PROGRESS NOTES
The A1c average glucose level is slightly improved.  The kidney function is unchanged compared to earlier this year.  We will discuss this and more at upcoming visit.

## 2020-11-06 ENCOUNTER — OFFICE VISIT (OUTPATIENT)
Dept: FAMILY MEDICINE CLINIC | Facility: CLINIC | Age: 75
End: 2020-11-06

## 2020-11-06 VITALS
HEIGHT: 68 IN | WEIGHT: 201.3 LBS | TEMPERATURE: 97.5 F | HEART RATE: 80 BPM | DIASTOLIC BLOOD PRESSURE: 89 MMHG | SYSTOLIC BLOOD PRESSURE: 143 MMHG | OXYGEN SATURATION: 97 % | BODY MASS INDEX: 30.51 KG/M2

## 2020-11-06 DIAGNOSIS — F33.41 RECURRENT MAJOR DEPRESSIVE DISORDER, IN PARTIAL REMISSION (HCC): ICD-10-CM

## 2020-11-06 DIAGNOSIS — N18.31 STAGE 3A CHRONIC KIDNEY DISEASE (HCC): Primary | ICD-10-CM

## 2020-11-06 DIAGNOSIS — I15.0 RENOVASCULAR HYPERTENSION: ICD-10-CM

## 2020-11-06 DIAGNOSIS — I70.1 RENAL ARTERY STENOSIS (HCC): ICD-10-CM

## 2020-11-06 DIAGNOSIS — E78.00 PURE HYPERCHOLESTEROLEMIA: ICD-10-CM

## 2020-11-06 DIAGNOSIS — R73.01 IMPAIRED FASTING GLUCOSE: ICD-10-CM

## 2020-11-06 PROCEDURE — 99214 OFFICE O/P EST MOD 30 MIN: CPT | Performed by: FAMILY MEDICINE

## 2020-11-06 RX ORDER — LISINOPRIL 10 MG/1
10 TABLET ORAL DAILY
Qty: 90 TABLET | Refills: 1 | Status: SHIPPED | OUTPATIENT
Start: 2020-11-06 | End: 2021-05-12 | Stop reason: SDUPTHER

## 2020-11-06 RX ORDER — FAMOTIDINE 20 MG/1
20 TABLET, FILM COATED ORAL 2 TIMES DAILY
Qty: 180 TABLET | Refills: 1 | Status: SHIPPED | OUTPATIENT
Start: 2020-11-06

## 2020-11-06 NOTE — PROGRESS NOTES
"Subjective   Jatin Gaston is a 74 y.o. male.     Hypertension (follow up )    History of Present Illness     Elevated creatinine.  At about 1.6 and stable for the last 6 months.  He is taking lisinopril 20 mg a.  Is been on omeprazole 40 mg a day for about a year.  Previously on 20 mg a day.  His GI doctor increased it last year.  He is not sure why.  No history of Nogueira's esophagitis known.  He states he does not take his omeprazole he gets heartburn symptoms that bother him.  Atrophic left kidney.  Left kidney infarction about 20 years ago.  Had a stent done.    Depression.  Stable.  Since increasing the fluoxetine last visit he states his anxiety is better.    Hypertension follow up. Doing well with current medication which he is taking as directed. No known high or low blood pressure episodes. No cardiovascular or neurological symptoms. Today's BP: 143/89.      Hyperlipidemia follow up. He is taking statin medication without complaint. No myopathy symptoms.     Lab Results   Component Value Date    CHOL 137 08/05/2020    CHLPL 142 02/05/2020    TRIG 149 08/05/2020    HDL 42 08/05/2020    LDL 65 08/05/2020       The following portions of the patient's history were reviewed and updated as appropriate: allergies, current medications, past family history, past medical history, past social history, past surgical history and problem list.      Review of Systems   Constitutional: Negative.    Respiratory: Negative.    Cardiovascular: Negative.    Musculoskeletal: Negative.        Objective   Blood pressure 143/89, pulse 80, temperature 97.5 °F (36.4 °C), temperature source Temporal, height 172.7 cm (67.99\"), weight 91.3 kg (201 lb 4.8 oz), SpO2 97 %.  Physical Exam    Assessment/Plan   Diagnoses and all orders for this visit:    1. Stage 3a chronic kidney disease (Primary)  -     Comprehensive Metabolic Panel; Future    2. Renal artery stenosis (CMS/HCC)  -     Comprehensive Metabolic Panel; Future    3. Pure " "hypercholesterolemia    4. Renovascular hypertension    5. Recurrent major depressive disorder, in partial remission (CMS/[facility])    6. Impaired fasting glucose  -     Hemoglobin A1c; Future    Other orders  -     lisinopril (PRINIVIL,ZESTRIL) 10 MG tablet; Take 1 tablet by mouth Daily.  Dispense: 90 tablet; Refill: 1  -     famotidine (Pepcid) 20 MG tablet; Take 1 tablet by mouth 2 (Two) Times a Day.  Dispense: 180 tablet; Refill: 1      Subacute chronic kidney disease.  Stage IIIa.  Possible acute kidney injury earlier this year.  I want to decrease his lisinopril from 20 mg day down to 10 mg a day.  He states his blood pressures running normal at home.  I recommend a nephrology consultation for patient declined.  He states he is \"seen too many doctors\".  I want him to gradually come off the omeprazole 40 mg a day and replace with famotidine 20 mg twice a day.  See him back in 3 months with lab work prior.    Renal artery stenosis.  Atrophic left kidney.  Elevated creatinine.  See above discussion.    Hypertension.  Well-controlled.  We will decrease the lisinopril.  He will continue to check his blood pressure at home.    Hyperlipidemia.  Known coronary disease.  Continue statin use.    Major depression.  Mostly in remission.  The anxiety is better.  Counseling today.         "

## 2021-02-12 ENCOUNTER — OFFICE VISIT (OUTPATIENT)
Dept: FAMILY MEDICINE CLINIC | Facility: CLINIC | Age: 76
End: 2021-02-12

## 2021-02-12 VITALS
DIASTOLIC BLOOD PRESSURE: 102 MMHG | TEMPERATURE: 97.8 F | HEIGHT: 68 IN | OXYGEN SATURATION: 98 % | HEART RATE: 74 BPM | WEIGHT: 208 LBS | BODY MASS INDEX: 31.52 KG/M2 | SYSTOLIC BLOOD PRESSURE: 149 MMHG

## 2021-02-12 DIAGNOSIS — F33.41 RECURRENT MAJOR DEPRESSIVE DISORDER, IN PARTIAL REMISSION (HCC): Chronic | ICD-10-CM

## 2021-02-12 DIAGNOSIS — I15.0 RENOVASCULAR HYPERTENSION: Primary | Chronic | ICD-10-CM

## 2021-02-12 DIAGNOSIS — N18.32 STAGE 3B CHRONIC KIDNEY DISEASE (HCC): Chronic | ICD-10-CM

## 2021-02-12 DIAGNOSIS — I25.10 CHRONIC CORONARY ARTERY DISEASE: Chronic | ICD-10-CM

## 2021-02-12 DIAGNOSIS — I70.1 RENAL ARTERY STENOSIS (HCC): Chronic | ICD-10-CM

## 2021-02-12 DIAGNOSIS — E78.00 PURE HYPERCHOLESTEROLEMIA: Chronic | ICD-10-CM

## 2021-02-12 PROCEDURE — 99214 OFFICE O/P EST MOD 30 MIN: CPT | Performed by: FAMILY MEDICINE

## 2021-02-12 RX ORDER — AMLODIPINE BESYLATE 10 MG/1
10 TABLET ORAL DAILY
Qty: 90 TABLET | Refills: 1 | Status: SHIPPED | OUTPATIENT
Start: 2021-02-12

## 2021-02-12 RX ORDER — OMEPRAZOLE 40 MG/1
40 CAPSULE, DELAYED RELEASE ORAL DAILY
COMMUNITY
Start: 2021-02-09

## 2021-02-12 NOTE — PROGRESS NOTES
"Chief Complaint  Chronic Kidney Disease (follow up )    Subjective          Jatin Gaston presents to [facility]  History of Present Illness    Renovascular hypertension follow-up.  He had a bump in his creatinine last visit.  I decreased his lisinopril.  I asked him to call back or stop if he could the omeprazole.  He only has 1 kidney.  He had a previous left renal infarct.  His creatinine is now down to baseline of 1.4.  Improved since last visit.  However his blood pressure is elevated today.  He is not been checking at home that often, except last time we checked it was normal.  He has no cardiovascular symptoms.    Hyperlipidemia.  He continues Zetia and rosuvastatin.  No myopathy symptoms described.    Known coronary disease.  He follows with his cardiologist.    GERD.  Follows with his gastroenterologist.  He is back on omeprazole because the Pepcid was not helping.  He had a recent visit with his gastroenterologist.    Major depression.  This time mostly in remission.  Continues maintenance SSRI therapy.       Objective   Vital Signs:   BP (!) 149/102   Pulse 74   Temp 97.8 °F (36.6 °C) (Temporal)   Ht 172.7 cm (67.99\")   Wt 94.3 kg (208 lb)   SpO2 98%   BMI 31.63 kg/m²     Physical Exam  Vitals signs and nursing note reviewed.   Constitutional:       General: He is not in acute distress.     Appearance: He is well-developed.   Neck:      Thyroid: No thyromegaly.   Cardiovascular:      Rate and Rhythm: Normal rate and regular rhythm.      Heart sounds: Normal heart sounds.   Pulmonary:      Effort: Pulmonary effort is normal.      Breath sounds: Normal breath sounds.   Skin:     General: Skin is warm and dry.   Psychiatric:         Behavior: Behavior normal.         Thought Content: Thought content normal.         Judgment: Judgment normal.       Repeat blood pressure is 160/95.    Result Review :   The following data was reviewed by: Glen Salas MD on " 02/12/2021:  Common labs    Common Labsle 8/7/20 8/7/20 11/3/20 11/3/20 2/8/21 2/8/21    1045 1045 0942 0942 1004 1004   Glucose  125 (A) 106 (A)   92   BUN  28 (A) 16   17   Creatinine  1.65 (A) 1.63 (A)   1.41 (A)   eGFR Non  Am  41 (A) 42 (A)   49 (A)   eGFR  Am  50 (A) 51 (A)   60 (A)   Sodium  140 138   139   Potassium  4.6 4.7   4.3   Chloride  103 101   103   Calcium  9.0 9.4   8.9   Total Protein   6.4   6.3   Albumin   4.60   4.10   Total Bilirubin   0.4   0.4   Alkaline Phosphatase   84   73   AST (SGOT)   21   25   ALT (SGPT)   21   20   Hemoglobin A1C 6.10 (A)   5.80 (A) 5.70 (A)    (A) Abnormal value       Comments are available for some flowsheets but are not being displayed.                     Assessment and Plan    Diagnoses and all orders for this visit:    1. Renovascular hypertension (Primary)    2. Renal artery stenosis (CMS/HCC)    3. Pure hypercholesterolemia  -     Comprehensive Metabolic Panel; Future  -     Lipid Panel; Future    4. Chronic coronary artery disease    5. Recurrent major depressive disorder, in partial remission (CMS/HCC)    6. Stage 3b chronic kidney disease (CMS/HCC)    Other orders  -     amLODIPine (NORVASC) 10 MG tablet; Take 1 tablet by mouth Daily.  Dispense: 90 tablet; Refill: 1      Renovascular hypertension.  Previous left renal infarct/renal artery stenosis.  Essentially only one working kidney.  The creatinine is back down to baseline of 1.4 since decreasing his lisinopril.  Continue lisinopril as is.  We are increasing his amlodipine from 5 mg up to 10 mg a day.  He will continue to monitor blood pressure at home.  I will see him back in 3 months for recheck.  If creatinine gets worse, to consider nephrology consultation.    Hyperlipidemia.  Check a lipid panel prior to next visit.  Continue Zetia and rosuvastatin.    Known coronary artery disease.  Follows with cardiology.    Major depression.  In remission.  Continue fluoxetine..          Follow  Up   No follow-ups on file.  Patient was given instructions and counseling regarding his condition or for health maintenance advice. Please see specific information pulled into the AVS if appropriate.

## 2021-04-06 RX ORDER — FLUOXETINE HYDROCHLORIDE 20 MG/1
CAPSULE ORAL
Qty: 90 CAPSULE | Refills: 1 | Status: SHIPPED | OUTPATIENT
Start: 2021-04-06 | End: 2021-10-04

## 2021-04-22 DIAGNOSIS — E78.00 PURE HYPERCHOLESTEROLEMIA: Chronic | ICD-10-CM

## 2021-05-06 LAB
ALBUMIN SERPL-MCNC: 4.6 G/DL (ref 3.5–5.2)
ALBUMIN/GLOB SERPL: 2.3 G/DL
ALP SERPL-CCNC: 84 U/L (ref 39–117)
ALT SERPL-CCNC: 26 U/L (ref 1–41)
AST SERPL-CCNC: 24 U/L (ref 1–40)
BILIRUB SERPL-MCNC: 0.5 MG/DL (ref 0–1.2)
BUN SERPL-MCNC: 16 MG/DL (ref 8–23)
BUN/CREAT SERPL: 10.9 (ref 7–25)
CALCIUM SERPL-MCNC: 9.5 MG/DL (ref 8.6–10.5)
CHLORIDE SERPL-SCNC: 101 MMOL/L (ref 98–107)
CHOLEST SERPL-MCNC: 236 MG/DL (ref 0–200)
CO2 SERPL-SCNC: 26.3 MMOL/L (ref 22–29)
CREAT SERPL-MCNC: 1.47 MG/DL (ref 0.76–1.27)
GLOBULIN SER CALC-MCNC: 2 GM/DL
GLUCOSE SERPL-MCNC: 113 MG/DL (ref 65–99)
HDLC SERPL-MCNC: 45 MG/DL (ref 40–60)
LDLC SERPL CALC-MCNC: 146 MG/DL (ref 0–100)
POTASSIUM SERPL-SCNC: 4.6 MMOL/L (ref 3.5–5.2)
PROT SERPL-MCNC: 6.6 G/DL (ref 6–8.5)
SODIUM SERPL-SCNC: 137 MMOL/L (ref 136–145)
TRIGL SERPL-MCNC: 249 MG/DL (ref 0–150)
VLDLC SERPL CALC-MCNC: 45 MG/DL (ref 5–40)

## 2021-05-12 ENCOUNTER — OFFICE VISIT (OUTPATIENT)
Dept: FAMILY MEDICINE CLINIC | Facility: CLINIC | Age: 76
End: 2021-05-12

## 2021-05-12 VITALS
DIASTOLIC BLOOD PRESSURE: 87 MMHG | SYSTOLIC BLOOD PRESSURE: 139 MMHG | WEIGHT: 209 LBS | BODY MASS INDEX: 31.67 KG/M2 | HEART RATE: 82 BPM | OXYGEN SATURATION: 97 % | TEMPERATURE: 97.5 F | HEIGHT: 68 IN

## 2021-05-12 DIAGNOSIS — I70.1 RENAL ARTERY STENOSIS (HCC): Chronic | ICD-10-CM

## 2021-05-12 DIAGNOSIS — N18.32 STAGE 3B CHRONIC KIDNEY DISEASE (HCC): Chronic | ICD-10-CM

## 2021-05-12 DIAGNOSIS — E78.00 PURE HYPERCHOLESTEROLEMIA: Chronic | ICD-10-CM

## 2021-05-12 DIAGNOSIS — I25.10 CHRONIC CORONARY ARTERY DISEASE: Chronic | ICD-10-CM

## 2021-05-12 DIAGNOSIS — I15.0 RENOVASCULAR HYPERTENSION: Primary | Chronic | ICD-10-CM

## 2021-05-12 PROBLEM — K21.9 GERD (GASTROESOPHAGEAL REFLUX DISEASE): Status: ACTIVE | Noted: 2019-04-16

## 2021-05-12 PROCEDURE — 99214 OFFICE O/P EST MOD 30 MIN: CPT | Performed by: FAMILY MEDICINE

## 2021-05-12 RX ORDER — LISINOPRIL 10 MG/1
10 TABLET ORAL DAILY
Qty: 90 TABLET | Refills: 1 | Status: SHIPPED | OUTPATIENT
Start: 2021-05-12 | End: 2021-10-04

## 2021-05-12 NOTE — PROGRESS NOTES
"Chief Complaint  Hypertension (follow up )    Subjective          Jatin Gaston presents to [facility]  History of Present Illness     Renovascular hypertension.  His creatinine had gone up a little bit a few months ago.  We lowered his lisinopril from 20 mg down to 10 mg a day.  And at last visit I increased his amlodipine.  His blood pressure is much better now.  Is been running normal at home.  His creatinine is now stable with stage IIIb chronic kidney disease with a GFR in the high 40s.    Chronic coronary disease.  Continues to also follow with his cardiologist.  He is also taking Zetia.  He had stopped his rosuvastatin for a few weeks because of possible myopathy symptoms at the direction of his cardiologist nurse practitioner.  However the muscle aches did not go away.  He states is more joint pain.  No chest pain symptoms.    Major depression.  In remission.  He continues on SSRI maintenance therapy.    Objective   Vital Signs:   /87   Pulse 82   Temp 97.5 °F (36.4 °C) (Temporal)   Ht 172.7 cm (67.99\")   Wt 94.8 kg (209 lb)   SpO2 97%   BMI 31.79 kg/m²     Physical Exam  Vitals and nursing note reviewed.   Constitutional:       General: He is not in acute distress.     Appearance: He is well-developed.   Neck:      Thyroid: No thyromegaly.   Cardiovascular:      Rate and Rhythm: Normal rate and regular rhythm.      Heart sounds: Normal heart sounds.   Pulmonary:      Effort: Pulmonary effort is normal.      Breath sounds: Normal breath sounds.   Skin:     General: Skin is warm and dry.   Psychiatric:         Behavior: Behavior normal.         Thought Content: Thought content normal.         Judgment: Judgment normal.        Result Review :   The following data was reviewed by: Glen Salas MD on 05/12/2021:  Common labs    Common Labsle 11/3/20 11/3/20 2/8/21 2/8/21 5/6/21 5/6/21    0942 0942 1004 1004 1010 1010   Glucose 106 (A)   92  113 (A)   BUN 16   17  16 "   Creatinine 1.63 (A)   1.41 (A)  1.47 (A)   eGFR Non  Am 42 (A)   49 (A)  47 (A)   eGFR  Am 51 (A)   60 (A)  57 (A)   Sodium 138   139  137   Potassium 4.7   4.3  4.6   Chloride 101   103  101   Calcium 9.4   8.9  9.5   Total Protein 6.4   6.3  6.6   Albumin 4.60   4.10  4.60   Total Bilirubin 0.4   0.4  0.5   Alkaline Phosphatase 84   73  84   AST (SGOT) 21   25  24   ALT (SGPT) 21   20  26   Total Cholesterol     236 (A)    Triglycerides     249 (A)    HDL Cholesterol     45    LDL Cholesterol      146 (A)    Hemoglobin A1C  5.80 (A) 5.70 (A)      (A) Abnormal value       Comments are available for some flowsheets but are not being displayed.                     Assessment and Plan    Diagnoses and all orders for this visit:    1. Renovascular hypertension (Primary)    2. Pure hypercholesterolemia    3. Stage 3b chronic kidney disease (CMS/HCC)    4. Renal artery stenosis (CMS/HCC)    5. Chronic coronary artery disease    Other orders  -     lisinopril (PRINIVIL,ZESTRIL) 10 MG tablet; Take 1 tablet by mouth Daily.  Dispense: 90 tablet; Refill: 1      Renovascular hypertension with left renal artery stenosis and left atrophic kidney.  His GFR is in the high 40s and stabilized.  I will continue him on lisinopril 10 mg a day, a lower dose.  And continue the amlodipine 10 mg a day.  He is not on diuretics currently.  I will see him back in 6 months for annual Medicare wellness visit with labs prior.    Stage IIIb chronic kidney disease.  Stable.  Hold off nephrology consultation at this time.    Hyperlipidemia in the setting of known coronary disease.  Restart his rosuvastatin.  His LDL is up.  It did not help his aches coming off the rosuvastatin.  Likely osteoarthritis.      Follow Up   No follow-ups on file.  Patient was given instructions and counseling regarding his condition or for health maintenance advice. Please see specific information pulled into the AVS if appropriate.

## 2021-08-04 RX ORDER — FLUOXETINE 10 MG/1
CAPSULE ORAL
Qty: 90 CAPSULE | Refills: 1 | OUTPATIENT
Start: 2021-08-04

## 2021-10-04 RX ORDER — LISINOPRIL 10 MG/1
TABLET ORAL
Qty: 90 TABLET | Refills: 1 | Status: SHIPPED | OUTPATIENT
Start: 2021-10-04 | End: 2022-03-01 | Stop reason: SDUPTHER

## 2021-10-04 RX ORDER — FLUOXETINE HYDROCHLORIDE 20 MG/1
CAPSULE ORAL
Qty: 90 CAPSULE | Refills: 1 | Status: SHIPPED | OUTPATIENT
Start: 2021-10-04 | End: 2022-03-01 | Stop reason: SDUPTHER

## 2021-10-04 NOTE — TELEPHONE ENCOUNTER
Rx Refill Note  Requested Prescriptions     Pending Prescriptions Disp Refills   • FLUoxetine (PROzac) 20 MG capsule [Pharmacy Med Name: FLUOXETIN(P) CAP 20MG] 90 capsule 1     Sig: TAKE 1 CAPSULE DAILY   • lisinopril (PRINIVIL,ZESTRIL) 10 MG tablet [Pharmacy Med Name: LISINOPRIL TAB 10MG] 90 tablet 1     Sig: TAKE 1 TABLET DAILY      Last office visit with prescribing clinician: 5/12/2021      Next office visit with prescribing clinician: 11/15/2021            Bety Marrero MA  10/04/21, 07:59 EDT

## 2021-11-08 DIAGNOSIS — Z00.00 HEALTHCARE MAINTENANCE: ICD-10-CM

## 2021-11-08 DIAGNOSIS — E78.00 PURE HYPERCHOLESTEROLEMIA: Primary | ICD-10-CM

## 2021-11-08 DIAGNOSIS — R73.01 IMPAIRED FASTING GLUCOSE: ICD-10-CM

## 2021-11-09 LAB
ALBUMIN SERPL-MCNC: 4.6 G/DL (ref 3.7–4.7)
ALBUMIN/GLOB SERPL: 2.2 {RATIO} (ref 1.2–2.2)
ALP SERPL-CCNC: 89 IU/L (ref 44–121)
ALT SERPL-CCNC: 38 IU/L (ref 0–44)
AST SERPL-CCNC: 27 IU/L (ref 0–40)
BILIRUB SERPL-MCNC: 0.5 MG/DL (ref 0–1.2)
BUN SERPL-MCNC: 12 MG/DL (ref 8–27)
BUN/CREAT SERPL: 9 (ref 10–24)
CALCIUM SERPL-MCNC: 9.3 MG/DL (ref 8.6–10.2)
CHLORIDE SERPL-SCNC: 102 MMOL/L (ref 96–106)
CHOLEST SERPL-MCNC: 159 MG/DL (ref 100–199)
CO2 SERPL-SCNC: 23 MMOL/L (ref 20–29)
CREAT SERPL-MCNC: 1.41 MG/DL (ref 0.76–1.27)
GLOBULIN SER CALC-MCNC: 2.1 G/DL (ref 1.5–4.5)
GLUCOSE SERPL-MCNC: 114 MG/DL (ref 65–99)
HBA1C MFR BLD: 6.3 % (ref 4.8–5.6)
HCV AB S/CO SERPL IA: <0.1 S/CO RATIO (ref 0–0.9)
HDLC SERPL-MCNC: 42 MG/DL
LDLC SERPL CALC-MCNC: 89 MG/DL (ref 0–99)
POTASSIUM SERPL-SCNC: 4.5 MMOL/L (ref 3.5–5.2)
PROT SERPL-MCNC: 6.7 G/DL (ref 6–8.5)
SODIUM SERPL-SCNC: 141 MMOL/L (ref 134–144)
TRIGL SERPL-MCNC: 163 MG/DL (ref 0–149)
VLDLC SERPL CALC-MCNC: 28 MG/DL (ref 5–40)

## 2021-11-15 ENCOUNTER — OFFICE VISIT (OUTPATIENT)
Dept: FAMILY MEDICINE CLINIC | Facility: CLINIC | Age: 76
End: 2021-11-15

## 2021-11-15 VITALS
SYSTOLIC BLOOD PRESSURE: 140 MMHG | RESPIRATION RATE: 16 BRPM | HEIGHT: 68 IN | OXYGEN SATURATION: 96 % | HEART RATE: 78 BPM | WEIGHT: 211.8 LBS | DIASTOLIC BLOOD PRESSURE: 91 MMHG | TEMPERATURE: 97.5 F | BODY MASS INDEX: 32.1 KG/M2

## 2021-11-15 DIAGNOSIS — I25.10 CHRONIC CORONARY ARTERY DISEASE: Chronic | ICD-10-CM

## 2021-11-15 DIAGNOSIS — R73.01 IMPAIRED FASTING GLUCOSE: Chronic | ICD-10-CM

## 2021-11-15 DIAGNOSIS — Z00.00 MEDICARE ANNUAL WELLNESS VISIT, SUBSEQUENT: Primary | ICD-10-CM

## 2021-11-15 DIAGNOSIS — N18.32 STAGE 3B CHRONIC KIDNEY DISEASE (HCC): Chronic | ICD-10-CM

## 2021-11-15 DIAGNOSIS — K21.00 GASTROESOPHAGEAL REFLUX DISEASE WITH ESOPHAGITIS, UNSPECIFIED WHETHER HEMORRHAGE: Chronic | ICD-10-CM

## 2021-11-15 DIAGNOSIS — I15.0 RENOVASCULAR HYPERTENSION: Chronic | ICD-10-CM

## 2021-11-15 DIAGNOSIS — E78.00 PURE HYPERCHOLESTEROLEMIA: Chronic | ICD-10-CM

## 2021-11-15 DIAGNOSIS — F33.41 RECURRENT MAJOR DEPRESSIVE DISORDER, IN PARTIAL REMISSION (HCC): Chronic | ICD-10-CM

## 2021-11-15 PROBLEM — M10.079 IDIOPATHIC GOUT INVOLVING TOE: Chronic | Status: ACTIVE | Noted: 2020-02-05

## 2021-11-15 PROCEDURE — G0439 PPPS, SUBSEQ VISIT: HCPCS | Performed by: FAMILY MEDICINE

## 2021-11-15 PROCEDURE — 1170F FXNL STATUS ASSESSED: CPT | Performed by: FAMILY MEDICINE

## 2021-11-15 PROCEDURE — 99214 OFFICE O/P EST MOD 30 MIN: CPT | Performed by: FAMILY MEDICINE

## 2021-11-15 PROCEDURE — 1160F RVW MEDS BY RX/DR IN RCRD: CPT | Performed by: FAMILY MEDICINE

## 2021-11-15 NOTE — PROGRESS NOTES
"Chief Complaint  Medicare Wellness-subsequent    Subjective          Jatin Gaston presents to [facility]  History of Present Illness     Follow-up renovascular hypertension, previous renal artery stenosis with atrophic kidney, and stage IIIa/IIIb chronic kidney disease that is stable.  He continues lisinopril 10 mg a day.  Also continues amlodipine 10 mg a day.  Blood pressure is running nearly normal today.  No cardiovascular symptoms.    Known coronary disease.  He continues on aspirin 81 mg a day along with the Zetia and rosuvastatin 10 mg a day for hyperlipidemia and associated coronary disease.  No cardiovascular symptoms.    Major depression.  Mostly in remission.  Continues on fluoxetine 20 mg daily.    Impaired fasting glucose.  A1c is up to 6.3% and fasting glucose was 114.  He states has been eating a lot of sweets but cut back in the last couple weeks.  Minimal to no alcohol use.    Objective   Vital Signs:   /91   Pulse 78   Temp 97.5 °F (36.4 °C) (Temporal)   Resp 16   Ht 172.7 cm (67.99\")   Wt 96.1 kg (211 lb 12.8 oz)   SpO2 96%   BMI 32.21 kg/m²     Physical Exam  Vitals and nursing note reviewed.   Constitutional:       General: He is not in acute distress.     Appearance: He is well-developed.   Cardiovascular:      Rate and Rhythm: Normal rate and regular rhythm.      Heart sounds: Normal heart sounds.   Pulmonary:      Effort: Pulmonary effort is normal.      Breath sounds: Normal breath sounds.   Skin:     General: Skin is warm and dry.   Psychiatric:         Mood and Affect: Mood normal.        Result Review :   The following data was reviewed by: Glen Salas MD on 11/15/2021:  Common labs    Common Labsle 2/8/21 2/8/21 5/6/21 5/6/21 11/8/21 11/8/21 11/8/21    1004 1004 1010 1010 1030 1030 1030   Glucose  92  113 (A) 114 (A)     BUN  17  16 12     Creatinine  1.41 (A)  1.47 (A) 1.41 (A)     eGFR Non  Am  49 (A)  47 (A) 49 (A)     eGFR "  Am  60 (A)  57 (A) 57 (A)     Sodium  139  137 141     Potassium  4.3  4.6 4.5     Chloride  103  101 102     Calcium  8.9  9.5 9.3     Total Protein  6.3  6.6 6.7     Albumin  4.10  4.60 4.6     Total Bilirubin  0.4  0.5 0.5     Alkaline Phosphatase  73  84 89     AST (SGOT)  25  24 27     ALT (SGPT)  20  26 38     Total Cholesterol   236 (A)    159   Triglycerides   249 (A)    163 (A)   HDL Cholesterol   45    42   LDL Cholesterol    146 (A)    89   Hemoglobin A1C 5.70 (A)     6.3 (A)    (A) Abnormal value       Comments are available for some flowsheets but are not being displayed.                     Assessment and Plan    Diagnoses and all orders for this visit:    1. Medicare annual wellness visit, subsequent (Primary)    2. Renovascular hypertension  -     Comprehensive Metabolic Panel; Future    3. Pure hypercholesterolemia  -     Comprehensive Metabolic Panel; Future  -     Lipid Panel; Future    4. Chronic coronary artery disease    5. Gastroesophageal reflux disease with esophagitis, unspecified whether hemorrhage    6. Stage 3b chronic kidney disease (HCC)  -     Comprehensive Metabolic Panel; Future    7. Recurrent major depressive disorder, in partial remission (HCC)    8. Impaired fasting glucose  -     Hemoglobin A1c; Future      Hypertension and stage III chronic kidney disease.  Continues lisinopril and amlodipine.  Creatinine stable.  I want to be checking his blood pressure at home more often.  I will see him back in 6 months for recheck and lab work prior.    Hyperlipidemia in the setting of known coronary artery disease.  Continue Crestor and Zetia.  Continue atorvastatin and aspirin.    Major depression.  In mostly remission.  He continues Prozac.  He has been  now about 6 years.    Impaired fasting glucose.  He is cut back on his sweets and portion size.  He is not drinking alcohol very often.  No risky alcohol use.  Continue to monitor carbohydrates.  Check labs prior to next  visit.      Follow Up   No follow-ups on file.  Patient was given instructions and counseling regarding his condition or for health maintenance advice. Please see specific information pulled into the AVS if appropriate.

## 2021-12-29 RX ORDER — LISINOPRIL 10 MG/1
TABLET ORAL
Qty: 90 TABLET | Refills: 1 | OUTPATIENT
Start: 2021-12-29

## 2022-03-01 RX ORDER — LISINOPRIL 10 MG/1
10 TABLET ORAL DAILY
Qty: 90 TABLET | Refills: 1 | Status: SHIPPED | OUTPATIENT
Start: 2022-03-01 | End: 2022-07-27

## 2022-03-01 RX ORDER — FLUOXETINE HYDROCHLORIDE 20 MG/1
20 CAPSULE ORAL DAILY
Qty: 90 CAPSULE | Refills: 1 | Status: SHIPPED | OUTPATIENT
Start: 2022-03-01 | End: 2022-07-27

## 2022-03-01 NOTE — TELEPHONE ENCOUNTER
Rx Refill Note  Requested Prescriptions     Pending Prescriptions Disp Refills   • lisinopril (PRINIVIL,ZESTRIL) 10 MG tablet 90 tablet 1     Sig: Take 1 tablet by mouth Daily.   • FLUoxetine (PROzac) 20 MG capsule 90 capsule 1     Sig: Take 1 capsule by mouth Daily.      Last office visit with prescribing clinician: 11/15/2021      Next office visit with prescribing clinician: Visit date not found            Bety Marrero MA  03/01/22, 14:06 EST

## 2022-06-13 NOTE — PROGRESS NOTES
The ABCs of the Annual Wellness Visit  Subsequent Medicare Wellness Visit    Chief Complaint   Patient presents with   • Medicare Wellness-subsequent      Subjective    History of Present Illness:  Jatin Gaston is a 75 y.o. male who presents for a Subsequent Medicare Wellness Visit.    The following portions of the patient's history were reviewed and   updated as appropriate: allergies, current medications, past family history, past medical history, past social history, past surgical history and problem list.    Compared to one year ago, the patient feels his physical   health is the same.    Compared to one year ago, the patient feels his mental   health is the same.    Recent Hospitalizations:  He was not admitted to the hospital during the last year.       Current Medical Providers:  Patient Care Team:  Glen Salas MD as PCP - General (Family Medicine)    Outpatient Medications Prior to Visit   Medication Sig Dispense Refill   • amLODIPine (NORVASC) 10 MG tablet Take 1 tablet by mouth Daily. 90 tablet 1   • aspirin 81 MG EC tablet Take 81 mg by mouth daily.     • Cholecalciferol (D 1000) 25 MCG (1000 UT) capsule Take 1,000 Units by mouth Daily.     • Coenzyme Q10 (COQ-10) 150 MG capsule Take 1 capsule by mouth.     • CRESTOR 10 MG tablet      • FLUoxetine (PROzac) 20 MG capsule TAKE 1 CAPSULE DAILY 90 capsule 1   • lisinopril (PRINIVIL,ZESTRIL) 10 MG tablet TAKE 1 TABLET DAILY 90 tablet 1   • Multiple Vitamin (MULTIVITAMIN) capsule Take 1 capsule by mouth.     • Omega-3 Fatty Acids (FISH OIL) 1000 MG capsule capsule Take 2 capsules by mouth.     • omeprazole (priLOSEC) 40 MG capsule Take 40 mg by mouth Daily.     • ZETIA 10 MG tablet      • allopurinol (ZYLOPRIM) 100 MG tablet Take 1 tablet by mouth Daily. 30 tablet 0   • famotidine (Pepcid) 20 MG tablet Take 1 tablet by mouth 2 (Two) Times a Day. 180 tablet 1   • GAVILYTE-G 236 g solution        No facility-administered medications prior to visit.       No  "opioid medication identified on active medication list. I have reviewed chart for other potential  high risk medication/s and harmful drug interactions in the elderly.          Aspirin is on active medication list. Aspirin use is indicated based on review of current medical condition/s. Pros and cons of this therapy have been discussed today. Benefits of this medication outweigh potential harm.  Patient has been encouraged to continue taking this medication.  .      Patient Active Problem List   Diagnosis   • Renovascular hypertension   • Gastroesophageal reflux disease with esophagitis   • Pure hypercholesterolemia   • Colon polyp   • Dysphagia   • Chronic coronary artery disease   • History of malignant neoplasm of prostate   • Renal artery stenosis (HCC)   • Recurrent major depressive disorder, in partial remission (HCC)   • Idiopathic gout involving toe   • Stage 3b chronic kidney disease (HCC)   • GERD (gastroesophageal reflux disease)   • Headache   • Impaired fasting glucose     Advance Care Planning  Advance Directive is on file.  ACP discussion was held with the patient during this visit. Patient has an advance directive in EMR which is still valid.           Objective    Vitals:    11/15/21 1105   BP: 140/91   Pulse: 78   Resp: 16   Temp: 97.5 °F (36.4 °C)   TempSrc: Temporal   SpO2: 96%   Weight: 96.1 kg (211 lb 12.8 oz)   Height: 172.7 cm (67.99\")   PainSc: 0-No pain     BMI Readings from Last 1 Encounters:   11/15/21 32.21 kg/m²   BMI is above normal parameters. Recommendations include: exercise counseling and nutrition counseling    Does the patient have evidence of cognitive impairment? No    Physical Exam  Lab Results   Component Value Date    CHLPL 159 11/08/2021    TRIG 163 (H) 11/08/2021    HDL 42 11/08/2021    LDL 89 11/08/2021    VLDL 28 11/08/2021    HGBA1C 6.3 (H) 11/08/2021            HEALTH RISK ASSESSMENT    Smoking Status:  Social History     Tobacco Use   Smoking Status Never Smoker "   Smokeless Tobacco Former User     Alcohol Consumption:  Social History     Substance and Sexual Activity   Alcohol Use Yes    Comment: occasional     Fall Risk Screen:    MOISÉS Fall Risk Assessment was completed, and patient is at LOW risk for falls.Assessment completed on:11/15/2021    Depression Screening:  PHQ-2/PHQ-9 Depression Screening 11/15/2021   Little interest or pleasure in doing things 0   Feeling down, depressed, or hopeless 0   Trouble falling or staying asleep, or sleeping too much -   Feeling tired or having little energy -   Poor appetite or overeating -   Feeling bad about yourself - or that you are a failure or have let yourself or your family down -   Trouble concentrating on things, such as reading the newspaper or watching television -   Moving or speaking so slowly that other people could have noticed. Or the opposite - being so fidgety or restless that you have been moving around a lot more than usual -   Thoughts that you would be better off dead, or of hurting yourself in some way -   Total Score 0   If you checked off any problems, how difficult have these problems made it for you to do your work, take care of things at home, or get along with other people? -       Health Habits and Functional and Cognitive Screening:  Functional & Cognitive Status 11/15/2021   Do you have difficulty preparing food and eating? No   Do you have difficulty bathing yourself, getting dressed or grooming yourself? No   Do you have difficulty using the toilet? No   Do you have difficulty moving around from place to place? No   Do you have trouble with steps or getting out of a bed or a chair? No   Current Diet Well Balanced Diet   Dental Exam Up to date   Eye Exam Not up to date   Exercise (times per week) 7 times per week   Current Exercises Include Walking;Yard Work   Current Exercise Activities Include -   Do you need help using the phone?  No   Are you deaf or do you have serious difficulty hearing?  No    Do you need help with transportation? No   Do you need help shopping? No   Do you need help preparing meals?  No   Do you need help with housework?  No   Do you need help with laundry? No   Do you need help taking your medications? No   Do you need help managing money? No   Do you ever drive or ride in a car without wearing a seat belt? No   Have you felt unusual stress, anger or loneliness in the last month? No   Who do you live with? Alone   If you need help, do you have trouble finding someone available to you? No   Have you been bothered in the last four weeks by sexual problems? No   Do you have difficulty concentrating, remembering or making decisions? No       Age-appropriate Screening Schedule:  Refer to the list below for future screening recommendations based on patient's age, sex and/or medical conditions. Orders for these recommended tests are listed in the plan section. The patient has been provided with a written plan.    Health Maintenance   Topic Date Due   • TDAP/TD VACCINES (1 - Tdap) Never done   • ZOSTER VACCINE (2 of 3) 05/15/2016   • INFLUENZA VACCINE  11/15/2022 (Originally 8/1/2021)   • LIPID PANEL  11/08/2022              Assessment/Plan   CMS Preventative Services Quick Reference  Risk Factors Identified During Encounter  Depression/Dysphoria  Immunizations Discussed/Encouraged (specific Immunizations; Shingrix  Obesity/Overweight   The above risks/problems have been discussed with the patient.  Follow up actions/plans if indicated are seen below in the Assessment/Plan Section.  Pertinent information has been shared with the patient in the After Visit Summary.    Diagnoses and all orders for this visit:    1. Medicare annual wellness visit, subsequent (Primary)    2. Renovascular hypertension    3. Pure hypercholesterolemia    4. Chronic coronary artery disease    5. Gastroesophageal reflux disease with esophagitis, unspecified whether hemorrhage    6. Stage 3b chronic kidney disease  (HCC)    7. Recurrent major depressive disorder, in partial remission (HCC)    8. Impaired fasting glucose        Follow Up:   No follow-ups on file.     An After Visit Summary and PPPS were made available to the patient.                    normal

## 2022-07-27 RX ORDER — FLUOXETINE HYDROCHLORIDE 20 MG/1
CAPSULE ORAL
Qty: 90 CAPSULE | Refills: 1 | Status: SHIPPED | OUTPATIENT
Start: 2022-07-27 | End: 2022-09-07

## 2022-07-27 RX ORDER — LISINOPRIL 10 MG/1
TABLET ORAL
Qty: 90 TABLET | Refills: 1 | Status: SHIPPED | OUTPATIENT
Start: 2022-07-27

## 2022-07-27 NOTE — TELEPHONE ENCOUNTER
Rx Refill Note  Requested Prescriptions     Pending Prescriptions Disp Refills   • FLUoxetine (PROzac) 20 MG capsule [Pharmacy Med Name: FLUOXETINE HYDROCHLORIDE 20 MG Capsule] 90 capsule 1     Sig: TAKE 1 CAPSULE EVERY DAY   • lisinopril (PRINIVIL,ZESTRIL) 10 MG tablet [Pharmacy Med Name: LISINOPRIL 10 MG Tablet] 90 tablet 1     Sig: TAKE 1 TABLET EVERY DAY      Last office visit with prescribing clinician: 11/15/2021      Next office visit with prescribing clinician: Visit date not found       {TIP  Please add Last Relevant Lab Date if appropriate: 11/8/21    Roxane Harris, PCT  07/27/22, 09:58 EDT

## 2022-09-07 RX ORDER — FLUOXETINE HYDROCHLORIDE 20 MG/1
CAPSULE ORAL
Qty: 90 CAPSULE | Refills: 1 | Status: SHIPPED | OUTPATIENT
Start: 2022-09-07

## 2022-09-07 NOTE — TELEPHONE ENCOUNTER
Rx Refill Note  Requested Prescriptions     Pending Prescriptions Disp Refills   • FLUoxetine (PROzac) 20 MG capsule [Pharmacy Med Name: FLUOXETINE HYDROCHLORIDE 20 MG Capsule] 90 capsule 1     Sig: TAKE 1 CAPSULE EVERY DAY      Last office visit with prescribing clinician: 11/15/2021      Next office visit with prescribing clinician: Visit date not found            Francisco Mcclelland  09/07/22, 14:54 EDT

## 2022-11-26 NOTE — PROGRESS NOTES
Please call patient.  The kidney function appears mildly diminished.  The cholesterol looks good.  We will discuss more at upcoming visit.
30

## 2023-05-10 ENCOUNTER — OFFICE VISIT (OUTPATIENT)
Dept: FAMILY MEDICINE CLINIC | Facility: CLINIC | Age: 78
End: 2023-05-10
Payer: MEDICARE

## 2023-05-10 VITALS
WEIGHT: 209.2 LBS | HEART RATE: 72 BPM | HEIGHT: 68 IN | DIASTOLIC BLOOD PRESSURE: 88 MMHG | TEMPERATURE: 96.8 F | OXYGEN SATURATION: 95 % | BODY MASS INDEX: 31.71 KG/M2 | SYSTOLIC BLOOD PRESSURE: 142 MMHG

## 2023-05-10 DIAGNOSIS — E78.00 PURE HYPERCHOLESTEROLEMIA: ICD-10-CM

## 2023-05-10 DIAGNOSIS — I15.0 RENOVASCULAR HYPERTENSION: Primary | ICD-10-CM

## 2023-05-10 DIAGNOSIS — N18.32 STAGE 3B CHRONIC KIDNEY DISEASE: ICD-10-CM

## 2023-05-10 DIAGNOSIS — F33.41 RECURRENT MAJOR DEPRESSIVE DISORDER, IN PARTIAL REMISSION: ICD-10-CM

## 2023-05-10 DIAGNOSIS — R73.01 IMPAIRED FASTING GLUCOSE: ICD-10-CM

## 2023-05-10 PROCEDURE — 99214 OFFICE O/P EST MOD 30 MIN: CPT | Performed by: FAMILY MEDICINE

## 2023-05-10 PROCEDURE — 1160F RVW MEDS BY RX/DR IN RCRD: CPT | Performed by: FAMILY MEDICINE

## 2023-05-10 PROCEDURE — 1159F MED LIST DOCD IN RCRD: CPT | Performed by: FAMILY MEDICINE

## 2023-05-10 RX ORDER — HYDRALAZINE HYDROCHLORIDE 10 MG/1
TABLET, FILM COATED ORAL
COMMUNITY
Start: 2023-04-20

## 2023-05-10 RX ORDER — AMLODIPINE BESYLATE 10 MG/1
10 TABLET ORAL DAILY
Qty: 90 TABLET | Refills: 1 | Status: SHIPPED | OUTPATIENT
Start: 2023-05-10

## 2023-05-10 RX ORDER — FLUOXETINE HYDROCHLORIDE 20 MG/1
20 CAPSULE ORAL DAILY
Qty: 90 CAPSULE | Refills: 1 | Status: SHIPPED | OUTPATIENT
Start: 2023-05-10

## 2023-05-10 RX ORDER — LISINOPRIL 10 MG/1
10 TABLET ORAL DAILY
Qty: 90 TABLET | Refills: 1 | Status: SHIPPED | OUTPATIENT
Start: 2023-05-10

## 2023-05-10 NOTE — PROGRESS NOTES
"Chief Complaint  Hypertension    Subjective        Jatin Gaston presents to [facility]  History of Present Illness     Follow-up hypertension and other medical problems.  Have not seen him in about a year and a half.    Depression and anxiety anxiety.  He was started on fluoxetine about 5 years ago after his wife passed away.  He has done well with it.  No alcohol use.  He is getting some exercise.  Some trouble with arthritis in his legs.    Hypertension.  I reviewed his medication.  The medication list appears to be accurate.  He continues amlodipine, lisinopril, hydralazine 10 mg twice a day,.  He also has stage IIIa chronic kidney disease followed by nephrology.  I reviewed their notes.  He has a previous history of renal artery stenosis with stenting.  His kidney function but overall pretty stable.    Impaired fasting glucose.  His A1c was 6.3% about 2 years ago.  But he states since then he thinks is come down a bit.    Hyperlipidemia and medical management coronary artery disease.  Continues aspirin Zetia and Crestor.    GERD.  Continues omeprazole.  Not taking famotidine.  The nephrologist is concerned about possible allergic nephritis related to the PPI, but reportedly the urinary sediment has been \"bland\".    Objective   Vital Signs:  /88   Pulse 72   Temp 96.8 °F (36 °C) (Temporal)   Ht 172.7 cm (68\")   Wt 94.9 kg (209 lb 3.2 oz)   SpO2 95%   BMI 31.81 kg/m²   Estimated body mass index is 31.81 kg/m² as calculated from the following:    Height as of this encounter: 172.7 cm (68\").    Weight as of this encounter: 94.9 kg (209 lb 3.2 oz).             Physical Exam  Vitals and nursing note reviewed.   Constitutional:       General: He is not in acute distress.     Appearance: He is well-developed.   Cardiovascular:      Rate and Rhythm: Normal rate and regular rhythm.      Heart sounds: Normal heart sounds.   Pulmonary:      Effort: Pulmonary effort is normal.      " Breath sounds: Normal breath sounds.   Skin:     General: Skin is warm and dry.   Psychiatric:         Mood and Affect: Mood normal.        Result Review :                   Assessment and Plan   Diagnoses and all orders for this visit:    1. Renovascular hypertension (Primary)    2. Pure hypercholesterolemia  -     Hemoglobin A1c  -     Comprehensive Metabolic Panel  -     Lipid Panel  -     Hemoglobin A1c; Future  -     Comprehensive Metabolic Panel; Future  -     Lipid Panel; Future    3. Impaired fasting glucose  -     Hemoglobin A1c  -     Comprehensive Metabolic Panel  -     Lipid Panel  -     Hemoglobin A1c; Future  -     Comprehensive Metabolic Panel; Future  -     Lipid Panel; Future    4. Stage 3b chronic kidney disease  -     Hemoglobin A1c  -     Comprehensive Metabolic Panel  -     Lipid Panel  -     Hemoglobin A1c; Future  -     Comprehensive Metabolic Panel; Future  -     Lipid Panel; Future    5. Recurrent major depressive disorder, in partial remission    Other orders  -     FLUoxetine (PROzac) 20 MG capsule; Take 1 capsule by mouth Daily.  Dispense: 90 capsule; Refill: 1  -     amLODIPine (NORVASC) 10 MG tablet; Take 1 tablet by mouth Daily.  Dispense: 90 tablet; Refill: 1  -     lisinopril (PRINIVIL,ZESTRIL) 10 MG tablet; Take 1 tablet by mouth Daily.  Dispense: 90 tablet; Refill: 1      Hypertension.  Overall well controlled.  I refilled his amlodipine and lisinopril.  Checking labs today.  Hydralazine as prescribed by another provider.    Hyperlipidemia in the setting of medical management coronary artery disease.  Continues aspirin Zetia and rosuvastatin.  Continues with cardiology.    Impaired fasting glucose.  Last A1c 6.3% a couple of years ago.  He states has been better since then.  Checking today.  He had 4 cookies about 6 hours ago.    Major depression.  In remission.  Continues on maintenance fluoxetine.    Stage IIIa/IIIb chronic kidney disease.  Checking GFR today.  Followed by  nephrology.  Remote history of renal artery stenosis.    See me in 6 months for Medicare wellness visit with lab work prior    Major depression.  Mostly in remission.  Continues maintenance         Follow Up   No follow-ups on file.  Patient was given instructions and counseling regarding his condition or for health maintenance advice. Please see specific information pulled into the AVS if appropriate.

## 2023-05-11 LAB
ALBUMIN SERPL-MCNC: 4.5 G/DL (ref 3.5–5.2)
ALBUMIN/GLOB SERPL: 2 G/DL
ALP SERPL-CCNC: 102 U/L (ref 39–117)
ALT SERPL-CCNC: 30 U/L (ref 1–41)
AST SERPL-CCNC: 32 U/L (ref 1–40)
BILIRUB SERPL-MCNC: 0.4 MG/DL (ref 0–1.2)
BUN SERPL-MCNC: 15 MG/DL (ref 8–23)
BUN/CREAT SERPL: 10.2 (ref 7–25)
CALCIUM SERPL-MCNC: 9.4 MG/DL (ref 8.6–10.5)
CHLORIDE SERPL-SCNC: 102 MMOL/L (ref 98–107)
CHOLEST SERPL-MCNC: 130 MG/DL (ref 0–200)
CO2 SERPL-SCNC: 24.9 MMOL/L (ref 22–29)
CREAT SERPL-MCNC: 1.47 MG/DL (ref 0.76–1.27)
EGFRCR SERPLBLD CKD-EPI 2021: 49.4 ML/MIN/1.73
GLOBULIN SER CALC-MCNC: 2.3 GM/DL
GLUCOSE SERPL-MCNC: 116 MG/DL (ref 65–99)
HBA1C MFR BLD: 6.2 % (ref 4.8–5.6)
HDLC SERPL-MCNC: 37 MG/DL (ref 40–60)
LDLC SERPL CALC-MCNC: 65 MG/DL (ref 0–100)
POTASSIUM SERPL-SCNC: 4.7 MMOL/L (ref 3.5–5.2)
PROT SERPL-MCNC: 6.8 G/DL (ref 6–8.5)
SODIUM SERPL-SCNC: 141 MMOL/L (ref 136–145)
TRIGL SERPL-MCNC: 163 MG/DL (ref 0–150)
VLDLC SERPL CALC-MCNC: 28 MG/DL (ref 5–40)

## 2023-08-18 RX ORDER — LISINOPRIL 10 MG/1
TABLET ORAL
Qty: 90 TABLET | Refills: 1 | Status: SHIPPED | OUTPATIENT
Start: 2023-08-18

## 2023-08-18 NOTE — TELEPHONE ENCOUNTER
Rx Refill Note  Requested Prescriptions     Pending Prescriptions Disp Refills    lisinopril (PRINIVIL,ZESTRIL) 10 MG tablet [Pharmacy Med Name: LISINOPRIL 10 MG Tablet] 90 tablet 1     Sig: TAKE 1 TABLET EVERY DAY      Last office visit with prescribing clinician: 5/10/2023   Last telemedicine visit with prescribing clinician: Visit date not found   Next office visit with prescribing clinician: 11/13/2023                         Would you like a call back once the refill request has been completed: [] Yes [] No    If the office needs to give you a call back, can they leave a voicemail: [] Yes [] No    Francisco Mcclelland  08/18/23, 11:36 EDT

## 2023-10-09 RX ORDER — FLUOXETINE HYDROCHLORIDE 20 MG/1
20 CAPSULE ORAL DAILY
Qty: 90 CAPSULE | Refills: 1 | Status: SHIPPED | OUTPATIENT
Start: 2023-10-09

## 2023-10-09 NOTE — TELEPHONE ENCOUNTER
Rx Refill Note  Requested Prescriptions     Pending Prescriptions Disp Refills    FLUoxetine (PROzac) 20 MG capsule [Pharmacy Med Name: FLUOXETINE HYDROCHLORIDE 20 MG Capsule] 90 capsule 10     Sig: TAKE 1 CAPSULE EVERY DAY      Last office visit with prescribing clinician: 5/10/2023   Last telemedicine visit with prescribing clinician: Visit date not found   Next office visit with prescribing clinician: 11/13/2023                         Would you like a call back once the refill request has been completed: [] Yes [] No    If the office needs to give you a call back, can they leave a voicemail: [] Yes [] No    Khurram Draper MA  10/09/23, 11:58 EDT

## 2023-10-30 RX ORDER — AMLODIPINE BESYLATE 10 MG/1
10 TABLET ORAL DAILY
Qty: 90 TABLET | Refills: 1 | Status: SHIPPED | OUTPATIENT
Start: 2023-10-30

## 2023-10-30 NOTE — TELEPHONE ENCOUNTER
Rx Refill Note  Requested Prescriptions     Pending Prescriptions Disp Refills    amLODIPine (NORVASC) 10 MG tablet [Pharmacy Med Name: AMLODIPINE BESYLATE 10 MG Tablet] 90 tablet 1     Sig: TAKE 1 TABLET EVERY DAY      Last office visit with prescribing clinician: 5/10/2023   Last telemedicine visit with prescribing clinician: Visit date not found   Next office visit with prescribing clinician: 11/13/2023                         Would you like a call back once the refill request has been completed: [] Yes [] No    If the office needs to give you a call back, can they leave a voicemail: [] Yes [] No    Francisco Mcclelland  10/30/23, 16:14 EDT

## 2023-11-13 ENCOUNTER — OFFICE VISIT (OUTPATIENT)
Dept: FAMILY MEDICINE CLINIC | Facility: CLINIC | Age: 78
End: 2023-11-13
Payer: MEDICARE

## 2023-11-13 VITALS
DIASTOLIC BLOOD PRESSURE: 92 MMHG | SYSTOLIC BLOOD PRESSURE: 139 MMHG | WEIGHT: 211.5 LBS | OXYGEN SATURATION: 96 % | HEART RATE: 77 BPM | TEMPERATURE: 97.8 F | HEIGHT: 68 IN | BODY MASS INDEX: 32.05 KG/M2

## 2023-11-13 DIAGNOSIS — I70.1 RENAL ARTERY STENOSIS: Chronic | ICD-10-CM

## 2023-11-13 DIAGNOSIS — Z00.00 MEDICARE ANNUAL WELLNESS VISIT, SUBSEQUENT: Primary | ICD-10-CM

## 2023-11-13 DIAGNOSIS — F33.41 RECURRENT MAJOR DEPRESSIVE DISORDER, IN PARTIAL REMISSION: ICD-10-CM

## 2023-11-13 DIAGNOSIS — R73.01 IMPAIRED FASTING GLUCOSE: ICD-10-CM

## 2023-11-13 DIAGNOSIS — E78.00 PURE HYPERCHOLESTEROLEMIA: ICD-10-CM

## 2023-11-13 DIAGNOSIS — N18.32 STAGE 3B CHRONIC KIDNEY DISEASE: ICD-10-CM

## 2023-11-13 DIAGNOSIS — I15.0 RENOVASCULAR HYPERTENSION: ICD-10-CM

## 2023-11-13 NOTE — PROGRESS NOTES
The ABCs of the Annual Wellness Visit  Subsequent Medicare Wellness Visit    Subjective    Jatin Gaston is a 77 y.o. male who presents for a Subsequent Medicare Wellness Visit.    The following portions of the patient's history were reviewed and   updated as appropriate: allergies, current medications, past family history, past medical history, past social history, past surgical history, and problem list.    Compared to one year ago, the patient feels his physical   health is the same.    Compared to one year ago, the patient feels his mental   health is the same.    Recent Hospitalizations:  He was not admitted to the hospital during the last year.       Current Medical Providers:  Patient Care Team:  Glen Salas MD as PCP - General (Family Medicine)    Outpatient Medications Prior to Visit   Medication Sig Dispense Refill    amLODIPine (NORVASC) 10 MG tablet TAKE 1 TABLET EVERY DAY 90 tablet 1    aspirin 81 MG EC tablet Take 1 tablet by mouth Daily.      Cholecalciferol (D 1000) 25 MCG (1000 UT) capsule Take 1 capsule by mouth Daily.      Coenzyme Q10 (COQ-10) 150 MG capsule Take 1 capsule by mouth.      CRESTOR 10 MG tablet       FLUoxetine (PROzac) 20 MG capsule TAKE 1 CAPSULE EVERY DAY 90 capsule 1    hydrALAZINE (APRESOLINE) 10 MG tablet       lisinopril (PRINIVIL,ZESTRIL) 10 MG tablet TAKE 1 TABLET EVERY DAY 90 tablet 1    Multiple Vitamin (MULTIVITAMIN) capsule Take 1 capsule by mouth.      Omega-3 Fatty Acids (FISH OIL) 1000 MG capsule capsule Take 2 capsules by mouth.      omeprazole (priLOSEC) 40 MG capsule Take 1 capsule by mouth Daily.      ZETIA 10 MG tablet        No facility-administered medications prior to visit.       No opioid medication identified on active medication list. I have reviewed chart for other potential  high risk medication/s and harmful drug interactions in the elderly.        Aspirin is on active medication list. Aspirin use is indicated based on review of current medical  "condition/s. Pros and cons of this therapy have been discussed today. Benefits of this medication outweigh potential harm.  Patient has been encouraged to continue taking this medication.  .      Patient Active Problem List   Diagnosis    Renovascular hypertension    Gastroesophageal reflux disease with esophagitis    Pure hypercholesterolemia    Colon polyp    Dysphagia    Chronic coronary artery disease    History of malignant neoplasm of prostate    Renal artery stenosis    Recurrent major depressive disorder, in partial remission    Idiopathic gout involving toe    Stage 3b chronic kidney disease    GERD (gastroesophageal reflux disease)    Headache    Impaired fasting glucose     Advance Care Planning   Advance Care Planning     Advance Directive is on file.  ACP discussion was held with the patient during this visit. Patient has an advance directive in EMR which is still valid.      Objective    Vitals:    23 1258   BP: 139/92   Pulse: 77   Temp: 97.8 °F (36.6 °C)   TempSrc: Temporal   SpO2: 96%   Weight: 95.9 kg (211 lb 8 oz)   Height: 172.7 cm (68\")     Estimated body mass index is 32.16 kg/m² as calculated from the following:    Height as of this encounter: 172.7 cm (68\").    Weight as of this encounter: 95.9 kg (211 lb 8 oz).    BMI is >= 30 and <35. (Class 1 Obesity). The following options were offered after discussion;: exercise counseling/recommendations      Does the patient have evidence of cognitive impairment? No          HEALTH RISK ASSESSMENT    Smoking Status:  Social History     Tobacco Use   Smoking Status Never   Smokeless Tobacco Former    Quit date:      Alcohol Consumption:  Social History     Substance and Sexual Activity   Alcohol Use Yes    Comment: occasional     Fall Risk Screen:    STEADI Fall Risk Assessment was completed, and patient is at LOW risk for falls.Assessment completed on:2023    Depression Screenin/13/2023    12:57 PM   PHQ-2/PHQ-9 Depression " Screening   Little Interest or Pleasure in Doing Things 0-->not at all   Feeling Down, Depressed or Hopeless 0-->not at all   PHQ-9: Brief Depression Severity Measure Score 0       Health Habits and Functional and Cognitive Screenin/13/2023    12:54 PM   Functional & Cognitive Status   Do you have difficulty preparing food and eating? No   Do you have difficulty bathing yourself, getting dressed or grooming yourself? No   Do you have difficulty using the toilet? No   Do you have difficulty moving around from place to place? No   Do you have trouble with steps or getting out of a bed or a chair? Yes   Current Diet Unhealthy Diet   Dental Exam Up to date   Eye Exam Not up to date   Current Exercises Include No Regular Exercise   Do you need help using the phone?  No   Are you deaf or do you have serious difficulty hearing?  No   Do you need help to go to places out of walking distance? No   Do you need help shopping? Yes   Do you need help preparing meals?  No   Do you need help with housework?  No   Do you need help with laundry? No   Do you need help taking your medications? No   Do you need help managing money? No   Do you ever drive or ride in a car without wearing a seat belt? No   Have you felt unusual stress, anger or loneliness in the last month? No   Who do you live with? Alone   If you need help, do you have trouble finding someone available to you? No   Have you been bothered in the last four weeks by sexual problems? No   Do you have difficulty concentrating, remembering or making decisions? No       Age-appropriate Screening Schedule:  Refer to the list below for future screening recommendations based on patient's age, sex and/or medical conditions. Orders for these recommended tests are listed in the plan section. The patient has been provided with a written plan.    Health Maintenance   Topic Date Due    TDAP/TD VACCINES (1 - Tdap) Never done    ZOSTER VACCINE (2 of 3) 05/15/2016    ANNUAL  WELLNESS VISIT  11/15/2022    BMI FOLLOWUP  11/15/2022    INFLUENZA VACCINE  Never done    COVID-19 Vaccine (4 - 2023-24 season) 09/01/2023    LIPID PANEL  07/27/2024    COLORECTAL CANCER SCREENING  05/09/2029    HEPATITIS C SCREENING  Completed    Pneumococcal Vaccine 65+  Completed                  CMS Preventative Services Quick Reference  Risk Factors Identified During Encounter  Immunizations Discussed/Encouraged: Influenza, Prevnar 20 (Pneumococcal 20-valent conjugate), Shingrix, COVID19, and RSV (Respiratory Syncytial Virus)  The above risks/problems have been discussed with the patient.  Pertinent information has been shared with the patient in the After Visit Summary.  An After Visit Summary and PPPS were made available to the patient.    Follow Up:   Next Medicare Wellness visit to be scheduled in 1 year.       Additional E&M Note during same encounter follows:  Patient has multiple medical problems which are significant and separately identifiable that require additional work above and beyond the Medicare Wellness Visit.      Chief Complaint  Medicare Wellness-subsequent    Subjective        HPI  Jatin Gaston is also being seen today for follow-up multiple medical problems.    Hypertension.  Renal artery stenosis with a stent number of years ago.  Continues to follow with his cardiologist also.  Possible chronic coronary artery disease.  The record is not clear.  He continues on aspirin 81 mg a day.  Also for hypertension amlodipine 10 mg a day, lisinopril 10 mg a day, and hydralazine 10 mg and unknown frequency.  The hydralazine as prescribed by cardiology.  Other blood pressure medications are prescribed by me.    Hyperlipidemia.  Continues both rosuvastatin 10 mg daily and Zetia 10 mg a day prescribed by cardiology.    Anxiety and major depression.  Mostly in remission.  Continues on fluoxetine 20 mg daily.  He would like to continue this dose.  Essentially no alcohol use.  Non-smoker.    Prediabetes.   "A1c 6.2% about 6 to 8 months ago.  No recent lab work.    Stage IIIa chronic kidney disease.  No recent lab work.  I did check the [employer] system.  Nothing recently.               Objective   Vital Signs:  /92   Pulse 77   Temp 97.8 °F (36.6 °C) (Temporal)   Ht 172.7 cm (68\")   Wt 95.9 kg (211 lb 8 oz)   SpO2 96%   BMI 32.16 kg/m²     Physical Exam  Constitutional:       Appearance: Normal appearance.   HENT:      Head: Atraumatic.      Mouth/Throat:      Pharynx: Oropharynx is clear. No oropharyngeal exudate or posterior oropharyngeal erythema.   Eyes:      Conjunctiva/sclera: Conjunctivae normal.   Neck:      Thyroid: No thyroid mass, thyromegaly or thyroid tenderness.   Cardiovascular:      Rate and Rhythm: Normal rate and regular rhythm.      Pulses: Normal pulses.      Heart sounds: Normal heart sounds.   Pulmonary:      Effort: Pulmonary effort is normal.      Breath sounds: Normal breath sounds.   Abdominal:      General: Abdomen is flat. There is no distension.      Palpations: Abdomen is soft. There is no mass.      Tenderness: There is no abdominal tenderness.      Hernia: No hernia is present.   Musculoskeletal:         General: Normal range of motion.      Cervical back: Normal range of motion and neck supple. No muscular tenderness.   Lymphadenopathy:      Cervical: No cervical adenopathy.   Skin:     General: Skin is warm and dry.      Findings: No rash.   Neurological:      Mental Status: He is alert and oriented to person, place, and time.   Psychiatric:         Mood and Affect: Mood normal.          The following data was reviewed by: Glen Salas MD on 11/13/2023:  Common labs          1/30/2023    09:51 2/8/2023    11:58 5/10/2023    13:37   Common Labs   Glucose   116    BUN   15    Creatinine   1.47    Sodium   141    Potassium   4.7    Chloride   102    Calcium   9.4    Total Protein   6.8    Albumin   4.5    Total Bilirubin   0.4    Alkaline Phosphatase   102    AST (SGOT)   32  "   ALT (SGPT)   30    Total Cholesterol   130    Triglycerides   163    HDL Cholesterol   37    LDL Cholesterol    65    Hemoglobin A1C 5.9      6.20    Uric Acid  7.2           Details          This result is from an external source.                        Assessment and Plan   Diagnoses and all orders for this visit:    1. Medicare annual wellness visit, subsequent (Primary)    2. Renovascular hypertension  -     Hemoglobin A1c  -     Comprehensive Metabolic Panel  -     Lipid Panel  -     CBC & Differential    3. Pure hypercholesterolemia  -     Hemoglobin A1c  -     Comprehensive Metabolic Panel  -     Lipid Panel  -     CBC & Differential    4. Impaired fasting glucose  -     Hemoglobin A1c  -     Comprehensive Metabolic Panel  -     Lipid Panel  -     CBC & Differential    5. Stage 3b chronic kidney disease  -     Hemoglobin A1c  -     Comprehensive Metabolic Panel  -     Lipid Panel  -     CBC & Differential    6. Recurrent major depressive disorder, in partial remission    7. Renal artery stenosis  -     Hemoglobin A1c  -     Comprehensive Metabolic Panel  -     Lipid Panel  -     CBC & Differential      Hypertension.  Remotely or artery stenosis with stent.  Continues hydralazine prescribed by cardiology.  Continues lisinopril and amlodipine prescribed by me.  Checking labs today.  Blood pressure elevated moderately today.  We will continue to monitor.    Hyperlipidemia in the setting of known renal artery stenosis and previous reported coronary artery disease.  Continues on aspirin.  Continues on rosuvastatin and Zetia prescribed by cardiology.  Checking lipid panel today.    Prediabetes.  A1c 6.2% last check about 6 months ago.  Rechecking today.  He states he likes sweets but he is cut back.    Major depression.  Mostly in remission.  Also previous history of anxiety.  He states he wants to continue on the fluoxetine 20 mg a day which was prescribed by me.  No change.    Immunizations.  I recommend flu  vaccine, RSV, shingles vaccine, and up-to-date COVID booster.  Patient states he will consider.    Colon cancer screening up-to-date.    Follow-up in 6 months.  Sooner as needed.       Follow Up   No follow-ups on file.  Patient was given instructions and counseling regarding his condition or for health maintenance advice. Please see specific information pulled into the AVS if appropriate.

## 2023-11-14 LAB
ALBUMIN SERPL-MCNC: 4.9 G/DL (ref 3.5–5.2)
ALBUMIN/GLOB SERPL: 2.2 G/DL
ALP SERPL-CCNC: 92 U/L (ref 39–117)
ALT SERPL-CCNC: 36 U/L (ref 1–41)
AST SERPL-CCNC: 28 U/L (ref 1–40)
BASOPHILS # BLD AUTO: 0.04 10*3/MM3 (ref 0–0.2)
BASOPHILS NFR BLD AUTO: 0.5 % (ref 0–1.5)
BILIRUB SERPL-MCNC: 0.3 MG/DL (ref 0–1.2)
BUN SERPL-MCNC: 22 MG/DL (ref 8–23)
BUN/CREAT SERPL: 14.5 (ref 7–25)
CALCIUM SERPL-MCNC: 9.7 MG/DL (ref 8.6–10.5)
CHLORIDE SERPL-SCNC: 102 MMOL/L (ref 98–107)
CHOLEST SERPL-MCNC: 151 MG/DL (ref 0–200)
CO2 SERPL-SCNC: 25.6 MMOL/L (ref 22–29)
CREAT SERPL-MCNC: 1.52 MG/DL (ref 0.76–1.27)
EGFRCR SERPLBLD CKD-EPI 2021: 47.5 ML/MIN/1.73
EOSINOPHIL # BLD AUTO: 0.08 10*3/MM3 (ref 0–0.4)
EOSINOPHIL NFR BLD AUTO: 1.1 % (ref 0.3–6.2)
ERYTHROCYTE [DISTWIDTH] IN BLOOD BY AUTOMATED COUNT: 12.2 % (ref 12.3–15.4)
GLOBULIN SER CALC-MCNC: 2.2 GM/DL
GLUCOSE SERPL-MCNC: 128 MG/DL (ref 65–99)
HBA1C MFR BLD: 6.3 % (ref 4.8–5.6)
HCT VFR BLD AUTO: 43.2 % (ref 37.5–51)
HDLC SERPL-MCNC: 42 MG/DL (ref 40–60)
HGB BLD-MCNC: 15 G/DL (ref 13–17.7)
IMM GRANULOCYTES # BLD AUTO: 0.01 10*3/MM3 (ref 0–0.05)
IMM GRANULOCYTES NFR BLD AUTO: 0.1 % (ref 0–0.5)
LDLC SERPL CALC-MCNC: 74 MG/DL (ref 0–100)
LYMPHOCYTES # BLD AUTO: 1.07 10*3/MM3 (ref 0.7–3.1)
LYMPHOCYTES NFR BLD AUTO: 14.4 % (ref 19.6–45.3)
MCH RBC QN AUTO: 31.7 PG (ref 26.6–33)
MCHC RBC AUTO-ENTMCNC: 34.7 G/DL (ref 31.5–35.7)
MCV RBC AUTO: 91.3 FL (ref 79–97)
MONOCYTES # BLD AUTO: 0.71 10*3/MM3 (ref 0.1–0.9)
MONOCYTES NFR BLD AUTO: 9.6 % (ref 5–12)
NEUTROPHILS # BLD AUTO: 5.51 10*3/MM3 (ref 1.7–7)
NEUTROPHILS NFR BLD AUTO: 74.3 % (ref 42.7–76)
NRBC BLD AUTO-RTO: 0 /100 WBC (ref 0–0.2)
PLATELET # BLD AUTO: 230 10*3/MM3 (ref 140–450)
POTASSIUM SERPL-SCNC: 4.7 MMOL/L (ref 3.5–5.2)
PROT SERPL-MCNC: 7.1 G/DL (ref 6–8.5)
RBC # BLD AUTO: 4.73 10*6/MM3 (ref 4.14–5.8)
SODIUM SERPL-SCNC: 138 MMOL/L (ref 136–145)
TRIGL SERPL-MCNC: 212 MG/DL (ref 0–150)
VLDLC SERPL CALC-MCNC: 35 MG/DL (ref 5–40)
WBC # BLD AUTO: 7.42 10*3/MM3 (ref 3.4–10.8)

## 2024-04-02 NOTE — TELEPHONE ENCOUNTER
Rx Refill Note  Requested Prescriptions     Pending Prescriptions Disp Refills    amLODIPine (NORVASC) 10 MG tablet [Pharmacy Med Name: AMLODIPINE BESYLATE 10 MG Tablet] 90 tablet 1     Sig: TAKE 1 TABLET EVERY DAY      Last office visit with prescribing clinician: 11/13/2023   Last telemedicine visit with prescribing clinician: Visit date not found   Next office visit with prescribing clinician: 5/20/2024                         Would you like a call back once the refill request has been completed: [] Yes [] No    If the office needs to give you a call back, can they leave a voicemail: [] Yes [] No    Francisco Mcclelland  04/02/24, 13:45 EDT

## 2024-04-03 RX ORDER — AMLODIPINE BESYLATE 10 MG/1
10 TABLET ORAL DAILY
Qty: 90 TABLET | Refills: 1 | Status: SHIPPED | OUTPATIENT
Start: 2024-04-03

## 2024-05-13 ENCOUNTER — TELEPHONE (OUTPATIENT)
Dept: FAMILY MEDICINE CLINIC | Facility: CLINIC | Age: 79
End: 2024-05-13

## 2024-05-13 DIAGNOSIS — R73.01 IMPAIRED FASTING GLUCOSE: Chronic | ICD-10-CM

## 2024-05-13 DIAGNOSIS — E78.00 PURE HYPERCHOLESTEROLEMIA: Primary | Chronic | ICD-10-CM

## 2024-05-13 DIAGNOSIS — I15.0 RENOVASCULAR HYPERTENSION: Chronic | ICD-10-CM

## 2024-05-13 RX ORDER — LISINOPRIL 10 MG/1
TABLET ORAL
Qty: 90 TABLET | Refills: 1 | Status: SHIPPED | OUTPATIENT
Start: 2024-05-13

## 2024-05-13 NOTE — TELEPHONE ENCOUNTER
Rx Refill Note  Requested Prescriptions     Pending Prescriptions Disp Refills    lisinopril (PRINIVIL,ZESTRIL) 10 MG tablet [Pharmacy Med Name: LISINOPRIL 10 MG Tablet] 90 tablet 1     Sig: TAKE 1 TABLET EVERY DAY      Last office visit with prescribing clinician: 11/13/2023   Last telemedicine visit with prescribing clinician: Visit date not found   Next office visit with prescribing clinician: 5/13/2024                         Would you like a call back once the refill request has been completed: [] Yes [] No    If the office needs to give you a call back, can they leave a voicemail: [] Yes [] No    Francisco Mcclelland  05/13/24, 08:14 EDT

## 2024-05-20 ENCOUNTER — OFFICE VISIT (OUTPATIENT)
Dept: FAMILY MEDICINE CLINIC | Facility: CLINIC | Age: 79
End: 2024-05-20
Payer: MEDICARE

## 2024-05-20 VITALS
BODY MASS INDEX: 31.27 KG/M2 | DIASTOLIC BLOOD PRESSURE: 80 MMHG | HEART RATE: 77 BPM | OXYGEN SATURATION: 98 % | TEMPERATURE: 96.8 F | HEIGHT: 68 IN | SYSTOLIC BLOOD PRESSURE: 122 MMHG | WEIGHT: 206.3 LBS

## 2024-05-20 DIAGNOSIS — F33.41 RECURRENT MAJOR DEPRESSIVE DISORDER, IN PARTIAL REMISSION: ICD-10-CM

## 2024-05-20 DIAGNOSIS — I15.0 RENOVASCULAR HYPERTENSION: Primary | ICD-10-CM

## 2024-05-20 DIAGNOSIS — R73.01 IMPAIRED FASTING GLUCOSE: ICD-10-CM

## 2024-05-20 DIAGNOSIS — I25.10 CHRONIC CORONARY ARTERY DISEASE: Chronic | ICD-10-CM

## 2024-05-20 DIAGNOSIS — N18.32 STAGE 3B CHRONIC KIDNEY DISEASE: ICD-10-CM

## 2024-05-20 DIAGNOSIS — E78.00 PURE HYPERCHOLESTEROLEMIA: ICD-10-CM

## 2024-05-20 PROCEDURE — 1126F AMNT PAIN NOTED NONE PRSNT: CPT | Performed by: FAMILY MEDICINE

## 2024-05-20 PROCEDURE — 99214 OFFICE O/P EST MOD 30 MIN: CPT | Performed by: FAMILY MEDICINE

## 2024-05-20 NOTE — PROGRESS NOTES
"Chief Complaint  Hypertension    Subjective        Jatin Gaston presents to [facility]  History of Present Illness    Hypertension follow-up.  Continues amlodipine 10 mg a day, hydralazine 10 mg twice a day which is prescribed by his cardiologist, and lisinopril 10 mg daily.  Recent creatinine at 1.4 with stage IIIa chronic kidney disease which is overall stable.  He has known coronary artery disease.  He continues on Crestor and Zetia.    Impaired fasting glucose.  Glucose last visit 128.  Recently 115.  A1c 6.5%.    Major depression.  Mostly remission.  No suicidal plan.  He has had suicidal ideation in the past.  He states his mood is overall unchanged.  He gets angry at times but not horribly depressed.  He continues fluoxetine.  At a therapeutic dose.    Objective   Vital Signs:  /80   Pulse 77   Temp 96.8 °F (36 °C) (Temporal)   Ht 172.7 cm (68\")   Wt 93.6 kg (206 lb 4.8 oz)   SpO2 98%   BMI 31.37 kg/m²   Estimated body mass index is 31.37 kg/m² as calculated from the following:    Height as of this encounter: 172.7 cm (68\").    Weight as of this encounter: 93.6 kg (206 lb 4.8 oz).               Physical Exam  Vitals and nursing note reviewed.   Constitutional:       General: He is not in acute distress.     Appearance: He is well-developed.   Cardiovascular:      Rate and Rhythm: Normal rate and regular rhythm.      Heart sounds: Normal heart sounds.   Pulmonary:      Effort: Pulmonary effort is normal.      Breath sounds: Normal breath sounds.   Skin:     General: Skin is warm and dry.   Psychiatric:         Mood and Affect: Mood normal.      Comments: Mood is not depressed today.  No suicidal ideation.        Result Review :    The following data was reviewed by: Glen Salas MD on 05/20/2024:  Common labs          11/13/2023    13:34 5/13/2024    12:37   Common Labs   Glucose 128  115    BUN 22  14    Creatinine 1.52  1.41    Sodium 138  141    Potassium 4.7  " 4.6    Chloride 102  102    Calcium 9.7  9.2    Total Protein 7.1  6.7    Albumin 4.9  4.4    Total Bilirubin 0.3  0.6    Alkaline Phosphatase 92  85    AST (SGOT) 28  30    ALT (SGPT) 36  28    WBC 7.42     Hemoglobin 15.0     Hematocrit 43.2     Platelets 230     Total Cholesterol 151  136    Triglycerides 212  138    HDL Cholesterol 42  41    LDL Cholesterol  74  71    Hemoglobin A1C 6.30  6.50                   Assessment and Plan     Diagnoses and all orders for this visit:    1. Renovascular hypertension (Primary)  -     Hemoglobin A1c; Future  -     CBC & Differential; Future  -     Comprehensive Metabolic Panel; Future  -     Lipid Panel; Future    2. Pure hypercholesterolemia  -     Hemoglobin A1c; Future  -     CBC & Differential; Future  -     Comprehensive Metabolic Panel; Future  -     Lipid Panel; Future    3. Impaired fasting glucose  -     Hemoglobin A1c; Future  -     CBC & Differential; Future  -     Comprehensive Metabolic Panel; Future  -     Lipid Panel; Future    4. Chronic coronary artery disease    5. Recurrent major depressive disorder, in partial remission    6. Stage 3b chronic kidney disease    Renovascular hypertension.  Previous renal artery stenosis.  Blood pressure initially elevated today but repeat is 122/80.  Continue all medication as is.  Note, hydralazine prescribed by cardiology.    Known coronary artery disease and hyperlipidemia.  Continue Zetia and rosuvastatin.  Continues aspirin.    Impaired fasting glucose.  Essentially meets diabetes criteria.  Patient wants to wait 6 months before any starting any medication.  He states he is going to work on his diet.    Depression.  Mostly in remission.  He has had suicidal ideation in the past but none recently.  Counseling given today.  He was given the number of the National suicide hotline.    See me in 6 months for Medicare wellness visit with lab work prior         Follow Up     Return in about 6 months (around 11/20/2024) for  Subsequent Medicare Wellness Visit, Lab Visit One Week Prior to Next Appointment.  Patient was given instructions and counseling regarding his condition or for health maintenance advice. Please see specific information pulled into the AVS if appropriate.

## 2024-05-30 NOTE — TELEPHONE ENCOUNTER
Rx Refill Note  Requested Prescriptions     Pending Prescriptions Disp Refills    FLUoxetine (PROzac) 20 MG capsule [Pharmacy Med Name: FLUOXETINE HYDROCHLORIDE 20 MG Capsule] 90 capsule 1     Sig: TAKE 1 CAPSULE EVERY DAY      Last office visit with prescribing clinician: 5/20/2024   Last telemedicine visit with prescribing clinician: Visit date not found   Next office visit with prescribing clinician: 11/22/2024                         Would you like a call back once the refill request has been completed: [] Yes [] No    If the office needs to give you a call back, can they leave a voicemail: [] Yes [] No    Francisco Mcclelland  05/30/24, 14:05 EDT

## 2024-05-31 RX ORDER — FLUOXETINE HYDROCHLORIDE 20 MG/1
20 CAPSULE ORAL DAILY
Qty: 90 CAPSULE | Refills: 1 | Status: SHIPPED | OUTPATIENT
Start: 2024-05-31

## 2024-09-18 RX ORDER — AMLODIPINE BESYLATE 10 MG/1
10 TABLET ORAL DAILY
Qty: 90 TABLET | Refills: 1 | Status: SHIPPED | OUTPATIENT
Start: 2024-09-18

## 2024-10-04 ENCOUNTER — TELEPHONE (OUTPATIENT)
Dept: FAMILY MEDICINE CLINIC | Facility: CLINIC | Age: 79
End: 2024-10-04
Payer: MEDICARE

## 2024-10-04 NOTE — TELEPHONE ENCOUNTER
Name: Jatin Gaston      Relationship: Self      Best Callback Number: [phone]       HUB PROVIDED THE RELAY MESSAGE FROM THE OFFICE      PATIENT: SCHEDULED PER NOTE    ADDITIONAL INFORMATION:

## 2024-10-07 RX ORDER — LISINOPRIL 10 MG/1
TABLET ORAL
Qty: 90 TABLET | Refills: 1 | Status: SHIPPED | OUTPATIENT
Start: 2024-10-07

## 2024-10-07 NOTE — TELEPHONE ENCOUNTER
Rx Refill Note  Requested Prescriptions     Pending Prescriptions Disp Refills    lisinopril (PRINIVIL,ZESTRIL) 10 MG tablet [Pharmacy Med Name: Lisinopril Oral Tablet 10 MG] 90 tablet 1     Sig: TAKE 1 TABLET EVERY DAY      Last office visit with prescribing clinician: 5/20/2024   Last telemedicine visit with prescribing clinician: Visit date not found   Next office visit with prescribing clinician: 11/25/2024                         Would you like a call back once the refill request has been completed: [] Yes [] No    If the office needs to give you a call back, can they leave a voicemail: [] Yes [] No    Francisco Mcclelland  10/07/24, 10:12 EDT

## 2024-10-29 ENCOUNTER — APPOINTMENT (OUTPATIENT)
Dept: ULTRASOUND IMAGING | Facility: HOSPITAL | Age: 79
End: 2024-10-29
Payer: MEDICARE

## 2024-10-29 ENCOUNTER — HOSPITAL ENCOUNTER (EMERGENCY)
Facility: HOSPITAL | Age: 79
Discharge: HOME | End: 2024-10-29
Attending: EMERGENCY MEDICINE | Admitting: EMERGENCY MEDICINE
Payer: MEDICARE

## 2024-10-29 ENCOUNTER — APPOINTMENT (OUTPATIENT)
Dept: GENERAL RADIOLOGY | Facility: HOSPITAL | Age: 79
End: 2024-10-29
Payer: MEDICARE

## 2024-10-29 ENCOUNTER — APPOINTMENT (OUTPATIENT)
Dept: CT IMAGING | Facility: HOSPITAL | Age: 79
End: 2024-10-29
Payer: MEDICARE

## 2024-10-29 VITALS
DIASTOLIC BLOOD PRESSURE: 87 MMHG | SYSTOLIC BLOOD PRESSURE: 122 MMHG | WEIGHT: 206 LBS | OXYGEN SATURATION: 96 % | TEMPERATURE: 98.3 F | HEIGHT: 68 IN | RESPIRATION RATE: 16 BRPM | HEART RATE: 98 BPM | BODY MASS INDEX: 31.22 KG/M2

## 2024-10-29 DIAGNOSIS — L50.9 HIVES: Primary | ICD-10-CM

## 2024-10-29 DIAGNOSIS — R06.09 DYSPNEA ON EXERTION: ICD-10-CM

## 2024-10-29 LAB
ALBUMIN SERPL-MCNC: 3.8 G/DL (ref 3.5–5.2)
ALBUMIN SERPL-MCNC: 4.1 G/DL (ref 3.5–5.2)
ALBUMIN/GLOB SERPL: 1.6 G/DL
ALBUMIN/GLOB SERPL: 1.6 G/DL
ALP SERPL-CCNC: 77 U/L (ref 39–117)
ALP SERPL-CCNC: 84 U/L (ref 39–117)
ALT SERPL W P-5'-P-CCNC: 22 U/L (ref 1–41)
ALT SERPL W P-5'-P-CCNC: 23 U/L (ref 1–41)
AMPHET+METHAMPHET UR QL: NEGATIVE
AMPHETAMINES UR QL: NEGATIVE
ANION GAP SERPL CALCULATED.3IONS-SCNC: 12 MMOL/L (ref 5–15)
ANION GAP SERPL CALCULATED.3IONS-SCNC: 14.9 MMOL/L (ref 5–15)
AST SERPL-CCNC: 23 U/L (ref 1–40)
AST SERPL-CCNC: 38 U/L (ref 1–40)
BACTERIA UR QL AUTO: ABNORMAL /HPF
BARBITURATES UR QL SCN: NEGATIVE
BASOPHILS # BLD AUTO: 0.01 10*3/MM3 (ref 0–0.2)
BASOPHILS NFR BLD AUTO: 0.1 % (ref 0–1.5)
BENZODIAZ UR QL SCN: POSITIVE
BILIRUB SERPL-MCNC: 0.7 MG/DL (ref 0–1.2)
BILIRUB SERPL-MCNC: 0.8 MG/DL (ref 0–1.2)
BILIRUB UR QL STRIP: ABNORMAL
BUN SERPL-MCNC: 19 MG/DL (ref 8–23)
BUN SERPL-MCNC: 20 MG/DL (ref 8–23)
BUN/CREAT SERPL: 10.1 (ref 7–25)
BUN/CREAT SERPL: 10.6 (ref 7–25)
BUPRENORPHINE SERPL-MCNC: NEGATIVE NG/ML
CALCIUM SPEC-SCNC: 8.9 MG/DL (ref 8.6–10.5)
CALCIUM SPEC-SCNC: 9.2 MG/DL (ref 8.6–10.5)
CANNABINOIDS SERPL QL: NEGATIVE
CHLORIDE SERPL-SCNC: 100 MMOL/L (ref 98–107)
CHLORIDE SERPL-SCNC: 103 MMOL/L (ref 98–107)
CLARITY UR: CLEAR
CO2 SERPL-SCNC: 21 MMOL/L (ref 22–29)
CO2 SERPL-SCNC: 22.1 MMOL/L (ref 22–29)
COCAINE UR QL: NEGATIVE
COLOR UR: ABNORMAL
CREAT SERPL-MCNC: 1.88 MG/DL (ref 0.76–1.27)
CREAT SERPL-MCNC: 1.89 MG/DL (ref 0.76–1.27)
D DIMER PPP FEU-MCNC: >20 MCGFEU/ML (ref 0–0.76)
D DIMER PPP FEU-MCNC: >20 MCGFEU/ML (ref 0–0.76)
DEPRECATED RDW RBC AUTO: 41.8 FL (ref 37–54)
EGFRCR SERPLBLD CKD-EPI 2021: 36.3 ML/MIN/1.73
EGFRCR SERPLBLD CKD-EPI 2021: 36.6 ML/MIN/1.73
EOSINOPHIL # BLD AUTO: 0.01 10*3/MM3 (ref 0–0.4)
EOSINOPHIL NFR BLD AUTO: 0.1 % (ref 0.3–6.2)
ERYTHROCYTE [DISTWIDTH] IN BLOOD BY AUTOMATED COUNT: 12.4 % (ref 12.3–15.4)
ETHANOL BLD-MCNC: <10 MG/DL (ref 0–10)
ETHANOL UR QL: <0.01 %
FLUAV RNA RESP QL NAA+PROBE: NOT DETECTED
FLUBV RNA RESP QL NAA+PROBE: NOT DETECTED
GEN 5 2HR TROPONIN T REFLEX: 12 NG/L
GLOBULIN UR ELPH-MCNC: 2.4 GM/DL
GLOBULIN UR ELPH-MCNC: 2.5 GM/DL
GLUCOSE SERPL-MCNC: 145 MG/DL (ref 65–99)
GLUCOSE SERPL-MCNC: 165 MG/DL (ref 65–99)
GLUCOSE UR STRIP-MCNC: NEGATIVE MG/DL
HCT VFR BLD AUTO: 44.8 % (ref 37.5–51)
HGB BLD-MCNC: 15.3 G/DL (ref 13–17.7)
HGB UR QL STRIP.AUTO: NEGATIVE
HYALINE CASTS UR QL AUTO: ABNORMAL /LPF
IMM GRANULOCYTES # BLD AUTO: 0.02 10*3/MM3 (ref 0–0.05)
IMM GRANULOCYTES NFR BLD AUTO: 0.3 % (ref 0–0.5)
KETONES UR QL STRIP: ABNORMAL
LEUKOCYTE ESTERASE UR QL STRIP.AUTO: NEGATIVE
LYMPHOCYTES # BLD AUTO: 0.79 10*3/MM3 (ref 0.7–3.1)
LYMPHOCYTES NFR BLD AUTO: 10.3 % (ref 19.6–45.3)
MCH RBC QN AUTO: 31.5 PG (ref 26.6–33)
MCHC RBC AUTO-ENTMCNC: 34.2 G/DL (ref 31.5–35.7)
MCV RBC AUTO: 92.2 FL (ref 79–97)
METHADONE UR QL SCN: NEGATIVE
MONOCYTES # BLD AUTO: 0.51 10*3/MM3 (ref 0.1–0.9)
MONOCYTES NFR BLD AUTO: 6.6 % (ref 5–12)
MUCOUS THREADS URNS QL MICRO: ABNORMAL /HPF
NEUTROPHILS NFR BLD AUTO: 6.35 10*3/MM3 (ref 1.7–7)
NEUTROPHILS NFR BLD AUTO: 82.6 % (ref 42.7–76)
NITRITE UR QL STRIP: NEGATIVE
NRBC BLD AUTO-RTO: 0 /100 WBC (ref 0–0.2)
NT-PROBNP SERPL-MCNC: 68.6 PG/ML (ref 0–1800)
OPIATES UR QL: NEGATIVE
OXYCODONE UR QL SCN: NEGATIVE
PCP UR QL SCN: NEGATIVE
PH UR STRIP.AUTO: 6 [PH] (ref 4.5–8)
PLATELET # BLD AUTO: 221 10*3/MM3 (ref 140–450)
PMV BLD AUTO: 11.1 FL (ref 6–12)
POTASSIUM SERPL-SCNC: 4.2 MMOL/L (ref 3.5–5.2)
POTASSIUM SERPL-SCNC: 5.2 MMOL/L (ref 3.5–5.2)
PROT SERPL-MCNC: 6.2 G/DL (ref 6–8.5)
PROT SERPL-MCNC: 6.6 G/DL (ref 6–8.5)
PROT UR QL STRIP: ABNORMAL
QT INTERVAL: 365 MS
QTC INTERVAL: 473 MS
RBC # BLD AUTO: 4.86 10*6/MM3 (ref 4.14–5.8)
RBC # UR STRIP: ABNORMAL /HPF
REF LAB TEST METHOD: ABNORMAL
RSV RNA RESP QL NAA+PROBE: NOT DETECTED
SARS-COV-2 RNA RESP QL NAA+PROBE: NOT DETECTED
SODIUM SERPL-SCNC: 136 MMOL/L (ref 136–145)
SODIUM SERPL-SCNC: 137 MMOL/L (ref 136–145)
SP GR UR STRIP: 1.04 (ref 1–1.03)
SQUAMOUS #/AREA URNS HPF: ABNORMAL /HPF
TRICYCLICS UR QL SCN: NEGATIVE
TROPONIN T DELTA: -1 NG/L
TROPONIN T SERPL HS-MCNC: 13 NG/L
TSH SERPL DL<=0.05 MIU/L-ACNC: 5.07 UIU/ML (ref 0.27–4.2)
UROBILINOGEN UR QL STRIP: ABNORMAL
WBC # UR STRIP: ABNORMAL /HPF
WBC NRBC COR # BLD AUTO: 7.69 10*3/MM3 (ref 3.4–10.8)

## 2024-10-29 PROCEDURE — 82077 ASSAY SPEC XCP UR&BREATH IA: CPT | Performed by: EMERGENCY MEDICINE

## 2024-10-29 PROCEDURE — 87637 SARSCOV2&INF A&B&RSV AMP PRB: CPT | Performed by: EMERGENCY MEDICINE

## 2024-10-29 PROCEDURE — 36415 COLL VENOUS BLD VENIPUNCTURE: CPT

## 2024-10-29 PROCEDURE — 80306 DRUG TEST PRSMV INSTRMNT: CPT | Performed by: EMERGENCY MEDICINE

## 2024-10-29 PROCEDURE — 93970 EXTREMITY STUDY: CPT

## 2024-10-29 PROCEDURE — 85379 FIBRIN DEGRADATION QUANT: CPT | Performed by: EMERGENCY MEDICINE

## 2024-10-29 PROCEDURE — 71045 X-RAY EXAM CHEST 1 VIEW: CPT

## 2024-10-29 PROCEDURE — 84443 ASSAY THYROID STIM HORMONE: CPT | Performed by: EMERGENCY MEDICINE

## 2024-10-29 PROCEDURE — 25010000002 METHYLPREDNISOLONE PER 125 MG: Performed by: EMERGENCY MEDICINE

## 2024-10-29 PROCEDURE — 83880 ASSAY OF NATRIURETIC PEPTIDE: CPT | Performed by: EMERGENCY MEDICINE

## 2024-10-29 PROCEDURE — 71275 CT ANGIOGRAPHY CHEST: CPT

## 2024-10-29 PROCEDURE — 99285 EMERGENCY DEPT VISIT HI MDM: CPT | Performed by: EMERGENCY MEDICINE

## 2024-10-29 PROCEDURE — 25510000001 IOPAMIDOL PER 1 ML: Performed by: EMERGENCY MEDICINE

## 2024-10-29 PROCEDURE — 96374 THER/PROPH/DIAG INJ IV PUSH: CPT

## 2024-10-29 PROCEDURE — 85025 COMPLETE CBC W/AUTO DIFF WBC: CPT | Performed by: EMERGENCY MEDICINE

## 2024-10-29 PROCEDURE — 93005 ELECTROCARDIOGRAM TRACING: CPT | Performed by: EMERGENCY MEDICINE

## 2024-10-29 PROCEDURE — 80053 COMPREHEN METABOLIC PANEL: CPT | Performed by: EMERGENCY MEDICINE

## 2024-10-29 PROCEDURE — 84484 ASSAY OF TROPONIN QUANT: CPT | Performed by: EMERGENCY MEDICINE

## 2024-10-29 PROCEDURE — 81001 URINALYSIS AUTO W/SCOPE: CPT | Performed by: EMERGENCY MEDICINE

## 2024-10-29 PROCEDURE — 96375 TX/PRO/DX INJ NEW DRUG ADDON: CPT

## 2024-10-29 RX ORDER — IOPAMIDOL 755 MG/ML
100 INJECTION, SOLUTION INTRAVASCULAR
Status: COMPLETED | OUTPATIENT
Start: 2024-10-29 | End: 2024-10-29

## 2024-10-29 RX ORDER — FAMOTIDINE 10 MG/ML
20 INJECTION, SOLUTION INTRAVENOUS ONCE
Status: COMPLETED | OUTPATIENT
Start: 2024-10-29 | End: 2024-10-29

## 2024-10-29 RX ORDER — METHYLPREDNISOLONE 4 MG/1
TABLET ORAL
Qty: 21 TABLET | Refills: 0 | Status: SHIPPED | OUTPATIENT
Start: 2024-10-29

## 2024-10-29 RX ORDER — METHYLPREDNISOLONE SODIUM SUCCINATE 125 MG/2ML
125 INJECTION, POWDER, LYOPHILIZED, FOR SOLUTION INTRAMUSCULAR; INTRAVENOUS ONCE
Status: COMPLETED | OUTPATIENT
Start: 2024-10-29 | End: 2024-10-29

## 2024-10-29 RX ADMIN — IOPAMIDOL 100 ML: 755 INJECTION, SOLUTION INTRAVENOUS at 10:39

## 2024-10-29 RX ADMIN — FAMOTIDINE 20 MG: 10 INJECTION, SOLUTION INTRAVENOUS at 09:16

## 2024-10-29 RX ADMIN — METHYLPREDNISOLONE SODIUM SUCCINATE 125 MG: 125 INJECTION, POWDER, FOR SOLUTION INTRAMUSCULAR; INTRAVENOUS at 09:15

## 2024-10-29 NOTE — ED PROVIDER NOTES
Subjective   History of Present Illness    Chief complaint: Hives and shortness of air    Location: Home    Quality/Severity: Hives on the abdome    Timing/Onset/Duration: Sunday    Modifying Factors: Worse with exertion    Associated Symptoms: No headache.  No fever chills or cough.  No sore throat earache or nasal congestion.  No chest pain.  No abdominal pain.  No diarrhea or burning on urination.  No nausea or vomiting.    Narrative: This 78-year-old presents with hives and increasing shortness of breath and weakness that started on Sunday.  Patient has started no new medications lotions perfumes soaps or shampoos.  Hives are noted to the abdomen, arms, legs.  50 mg of Benadryl was given IV by EMS.  No new meds or dosage changes.    PCP:Glen Salas MD      Review of Systems   Constitutional:  Negative for chills and fever.   HENT:  Negative for congestion and sore throat.    Respiratory:  Positive for shortness of breath. Negative for cough.    Cardiovascular:  Negative for chest pain, palpitations and leg swelling.   Gastrointestinal:  Negative for abdominal pain, diarrhea, nausea and vomiting.   Genitourinary:  Negative for difficulty urinating.   Musculoskeletal:  Negative for back pain.   Skin:  Positive for rash.   Neurological:  Negative for headaches.         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  Had neurosurgery.  Reportedly nontraumatic.       No Known Allergies    Past Surgical History:   Procedure Laterality Date    BRAIN HEMATOMA EVACUATION      PROSTATE SURGERY  2011    RENAL ARTERY STENT      ROOT CANAL         Family History   Problem Relation Age of Onset    Heart disease Mother     Dementia Father        Social History     Socioeconomic History     Marital status:    Tobacco Use    Smoking status: Never    Smokeless tobacco: Former     Quit date: 2003   Substance and Sexual Activity    Alcohol use: Yes     Comment: occasional    Drug use: No    Sexual activity: Defer           Objective   Physical Exam  Vitals (The temperature is 98.3 °F, pulse 105, respirations 18, /86, room air pulse ox 95%) and nursing note reviewed.   Constitutional:       Appearance: Normal appearance.   HENT:      Head: Normocephalic and atraumatic.      Right Ear: Tympanic membrane normal.      Left Ear: Tympanic membrane normal.      Nose: Nose normal.      Mouth/Throat:      Mouth: Mucous membranes are moist.   Cardiovascular:      Rate and Rhythm: Normal rate and regular rhythm.      Pulses: Normal pulses.      Heart sounds: Normal heart sounds. No murmur heard.     No friction rub. No gallop.   Pulmonary:      Effort: Pulmonary effort is normal.      Breath sounds: Normal breath sounds.   Abdominal:      General: Abdomen is flat. Bowel sounds are normal. There is no distension.      Palpations: There is no mass.      Tenderness: There is no abdominal tenderness. There is no right CVA tenderness, left CVA tenderness, guarding or rebound.      Hernia: No hernia is present.   Musculoskeletal:         General: No swelling or tenderness. Normal range of motion.      Cervical back: Normal range of motion and neck supple. No rigidity.   Skin:     General: Skin is warm and dry.      Capillary Refill: Capillary refill takes less than 2 seconds.      Findings: Rash present.   Neurological:      General: No focal deficit present.      Mental Status: He is alert and oriented to person, place, and time.      Cranial Nerves: No cranial nerve deficit.      Sensory: No sensory deficit.      Motor: No weakness.         Procedures           ED Course  ED Course as of 10/29/24 1403   Tue Oct 29, 2024   1003 The D-dimer is greater than 20. [RC]   1003 The high-sensitivity troponin is 13  and normal [RC]   1003 The neutrophil percent is 82%.  The CBC is otherwise unremarkable [RC]   1003 The COVID flu is negative [RC]   1003 The proBNP is 68 and normal. [RC]   1003 The glucose is 165, the creatinine is 1.88, the CO2 is 21, [RC]   1003 The GFR is 36.  The CMP is otherwise unremarkable. [RC]   1207 The urine drug screen is positive for benzodiazepines.  The patient has received benzodiazepines. [RC]   1207 The COVID flu is negative. [RC]   1207 The high-sensitivity troponin is 13 and normal. [RC]   1207 The CBC is unremarkable. [RC]   1208 The 2-hour troponin is 12 with a delta T of -1. [RC]   1208 The urinalysis reveals a specific gravity of 1.045.  The ketones are trace, bilirubin small, the protein is 100 mg/dL, leukocytes negative, nitrite negative.  The RBCs are none.  The white blood cell count 0-2, bacteria trace.  The urinalysis is otherwise unremarkable. [RC]   1209 The ethanol is less than 0.010 and unremarkable. [RC]   1209 The TSH is 5.070 and normal. [RC]   1209 The BNP is 68 and normal [RC]      ED Course User Index  [RC] Christian Calderon MD      08:46 EDT, 10/29/24:  EKG was obtained at 843 and read by me at 846.  EKG shows a sinus tachycardia with rate of 101.  There is a normal axis with no hypertrophy.  The DC, QRS, and QT intervals are unremarkable.  There is no ectopy.  There is borderline T wave abnormalities.  There is no acute ST elevation or depression.                             14:09 EDT, 10/29/24:  The patient was reassessed.  He feels better.  His vital signs were reviewed and are stable.  He is in no respiratory distress.  Lung exam: Clear to auscultation equal bilaterally.  Skin exam: Hives nearly resolved.    14:09 EDT, 10/29/24:  The patient's diagnosis of hives and dyspnea on exertion was discussed with him.  The patient will be given a prescription for Medrol Dosepak.  The patient should call Dr. Alejandre today or tomorrow for follow-up appointment within 1 week.   Patient thinks that he has a follow-up appointment in 1 November.  I counseled the patient that the appointment has been set for the first week of November, patient may keep this appointment, otherwise he should call and set the appointment for 1 week from now.  The patient should return to the emergency department if there is worsening hives, shortness of breath, worse in any way at all.  All the patient's questions were answered he will be discharged in good condition            Medical Decision Making      Final diagnoses:   Hives   Dyspnea on exertion       ED Disposition  ED Disposition       None            No follow-up provider specified.       Medication List      No changes were made to your prescriptions during this visit.       No orders to display     Labs Reviewed - No data to display  No results found.    .Christian Underwood MD  10/29/24 1148

## 2024-10-29 NOTE — DISCHARGE INSTRUCTIONS
Take the Medrol Dosepak as prescribed.  Call Dr. Alejandre today or tomorrow for follow-up appointment within 1 week.  Return to the emergency department if there is worsening hives, shortness of breath, worse anyway at all

## 2024-11-03 PROBLEM — R73.03 PREDIABETES: Status: ACTIVE | Noted: 2022-08-12

## 2024-11-03 PROBLEM — M10.9 GOUT, UNSPECIFIED: Status: ACTIVE | Noted: 2020-02-05

## 2024-11-03 PROBLEM — E78.2 MIXED HYPERLIPIDEMIA: Status: ACTIVE | Noted: 2023-03-12

## 2024-11-03 PROBLEM — I10 HYPERTENSION: Status: ACTIVE | Noted: 2023-03-12

## 2024-11-03 PROBLEM — N18.31 STAGE 3A CHRONIC KIDNEY DISEASE: Status: ACTIVE | Noted: 2021-02-12

## 2024-11-04 NOTE — TELEPHONE ENCOUNTER
Rx Refill Note  Requested Prescriptions     Pending Prescriptions Disp Refills    FLUoxetine (PROzac) 20 MG capsule [Pharmacy Med Name: FLUoxetine HCl Oral Capsule 20 MG] 90 capsule 1     Sig: TAKE 1 CAPSULE EVERY DAY      Last office visit with prescribing clinician: 5/20/2024   Last telemedicine visit with prescribing clinician: Visit date not found   Next office visit with prescribing clinician: 11/25/2024                         Would you like a call back once the refill request has been completed: [] Yes [] No    If the office needs to give you a call back, can they leave a voicemail: [] Yes [] No    Francisco Mcclelland  11/04/24, 15:36 EST

## 2024-11-25 ENCOUNTER — OFFICE VISIT (OUTPATIENT)
Dept: FAMILY MEDICINE CLINIC | Facility: CLINIC | Age: 79
End: 2024-11-25
Payer: MEDICARE

## 2024-11-25 VITALS
HEART RATE: 96 BPM | OXYGEN SATURATION: 98 % | BODY MASS INDEX: 31.37 KG/M2 | DIASTOLIC BLOOD PRESSURE: 87 MMHG | HEIGHT: 68 IN | SYSTOLIC BLOOD PRESSURE: 126 MMHG | WEIGHT: 207 LBS | TEMPERATURE: 96.9 F

## 2024-11-25 DIAGNOSIS — F33.41 RECURRENT MAJOR DEPRESSIVE DISORDER, IN PARTIAL REMISSION: ICD-10-CM

## 2024-11-25 DIAGNOSIS — I25.10 CHRONIC CORONARY ARTERY DISEASE: ICD-10-CM

## 2024-11-25 DIAGNOSIS — E78.00 PURE HYPERCHOLESTEROLEMIA: ICD-10-CM

## 2024-11-25 DIAGNOSIS — Z00.00 MEDICARE ANNUAL WELLNESS VISIT, SUBSEQUENT: Primary | ICD-10-CM

## 2024-11-25 DIAGNOSIS — R73.01 IMPAIRED FASTING GLUCOSE: ICD-10-CM

## 2024-11-25 DIAGNOSIS — I15.0 RENOVASCULAR HYPERTENSION: ICD-10-CM

## 2024-11-25 PROCEDURE — G0009 ADMIN PNEUMOCOCCAL VACCINE: HCPCS | Performed by: FAMILY MEDICINE

## 2024-11-25 PROCEDURE — 3074F SYST BP LT 130 MM HG: CPT | Performed by: FAMILY MEDICINE

## 2024-11-25 PROCEDURE — 99214 OFFICE O/P EST MOD 30 MIN: CPT | Performed by: FAMILY MEDICINE

## 2024-11-25 PROCEDURE — 96160 PT-FOCUSED HLTH RISK ASSMT: CPT | Performed by: FAMILY MEDICINE

## 2024-11-25 PROCEDURE — 1170F FXNL STATUS ASSESSED: CPT | Performed by: FAMILY MEDICINE

## 2024-11-25 PROCEDURE — 3079F DIAST BP 80-89 MM HG: CPT | Performed by: FAMILY MEDICINE

## 2024-11-25 PROCEDURE — 90677 PCV20 VACCINE IM: CPT | Performed by: FAMILY MEDICINE

## 2024-11-25 PROCEDURE — G0439 PPPS, SUBSEQ VISIT: HCPCS | Performed by: FAMILY MEDICINE

## 2024-11-25 PROCEDURE — 1126F AMNT PAIN NOTED NONE PRSNT: CPT | Performed by: FAMILY MEDICINE

## 2024-11-25 NOTE — PROGRESS NOTES
Subjective   The ABCs of the Annual Wellness Visit  Medicare Wellness Visit      Jatin Gaston is a 78 y.o. patient who presents for a Medicare Wellness Visit.    The following portions of the patient's history were reviewed and   updated as appropriate: allergies, current medications, past family history, past medical history, past social history, past surgical history, and problem list.    Compared to one year ago, the patient's physical   health is the same.  Compared to one year ago, the patient's mental   health is the same.    Recent Hospitalizations:  He was not admitted to the hospital during the last year.     Current Medical Providers:  Patient Care Team:  Glen Salas MD as PCP - General (Family Medicine)    Outpatient Medications Prior to Visit   Medication Sig Dispense Refill    amLODIPine (NORVASC) 10 MG tablet TAKE 1 TABLET EVERY DAY 90 tablet 1    aspirin 81 MG EC tablet Take 1 tablet by mouth Daily.      Cholecalciferol (D 1000) 25 MCG (1000 UT) capsule Take 1 capsule by mouth Daily.      Coenzyme Q10 (COQ-10) 150 MG capsule Take 1 capsule by mouth.      FLUoxetine (PROzac) 20 MG capsule TAKE 1 CAPSULE EVERY DAY 90 capsule 1    hydrALAZINE (APRESOLINE) 10 MG tablet       lisinopril (PRINIVIL,ZESTRIL) 10 MG tablet TAKE 1 TABLET EVERY DAY 90 tablet 1    methylPREDNISolone (Medrol) 4 MG dose pack follow package directions 21 tablet 0    Multiple Vitamin (MULTIVITAMIN) capsule Take 1 capsule by mouth.      Omega-3 Fatty Acids (FISH OIL) 1000 MG capsule capsule Take 2 capsules by mouth.      omeprazole (priLOSEC) 40 MG capsule Take 1 capsule by mouth Daily.      rosuvastatin (CRESTOR) 20 MG tablet Take 1 tablet by mouth Daily.      ZETIA 10 MG tablet        No facility-administered medications prior to visit.     No opioid medication identified on active medication list. I have reviewed chart for other potential  high risk medication/s and harmful drug interactions in the elderly.      Aspirin is on  "active medication list. Aspirin use is indicated based on review of current medical condition/s. Pros and cons of this therapy have been discussed today. Benefits of this medication outweigh potential harm.  Patient has been encouraged to continue taking this medication.  .      Patient Active Problem List   Diagnosis    Renovascular hypertension    Gastroesophageal reflux disease with esophagitis    Pure hypercholesterolemia    Colon polyp    Dysphagia    Coronary artery disease due to lipid rich plaque    History of malignant neoplasm of prostate    Renal artery stenosis    Recurrent major depressive disorder, in partial remission    Gout, unspecified    Stage 3a chronic kidney disease    Gastroesophageal reflux disease    Headache    Impaired fasting glucose    Hypertension    Mixed hyperlipidemia    Prediabetes     Advance Care Planning Advance Directive is on file.  ACP discussion was held with the patient during this visit. Patient has an advance directive in EMR which is still valid.             Objective   Vitals:    11/25/24 1330   BP: 126/87   Pulse: 96   Temp: 96.9 °F (36.1 °C)   TempSrc: Temporal   SpO2: 98%   Weight: 93.9 kg (207 lb)   Height: 172.7 cm (68\")       Estimated body mass index is 31.47 kg/m² as calculated from the following:    Height as of this encounter: 172.7 cm (68\").    Weight as of this encounter: 93.9 kg (207 lb).    BMI is >= 30 and <35. (Class 1 Obesity). The following options were offered after discussion;: exercise counseling/recommendations       Does the patient have evidence of cognitive impairment? No  Lab Results   Component Value Date    CHLPL 148 11/15/2024    TRIG 176 (H) 11/15/2024    HDL 48 11/15/2024    LDL 70 11/15/2024    VLDL 30 11/15/2024    HGBA1C 6.40 (H) 11/15/2024                                                                                                Health  Risk Assessment    Smoking Status:  Social History     Tobacco Use   Smoking Status Never "   Smokeless Tobacco Former    Quit date:      Alcohol Consumption:  Social History     Substance and Sexual Activity   Alcohol Use Yes    Comment: occasional       Fall Risk Screen  LIORADI Fall Risk Assessment was completed, and patient is at LOW risk for falls.Assessment completed on:2024    Depression Screening   Little interest or pleasure in doing things? Not at all   Feeling down, depressed, or hopeless? Not at all   PHQ-2 Total Score 0      Health Habits and Functional and Cognitive Screenin/25/2024     1:28 PM   Functional & Cognitive Status   Do you have difficulty preparing food and eating? No   Do you have difficulty bathing yourself, getting dressed or grooming yourself? No   Do you have difficulty using the toilet? No   Do you have difficulty moving around from place to place? No   Do you have trouble with steps or getting out of a bed or a chair? No   Current Diet Unhealthy Diet   Dental Exam Up to date   Eye Exam Not up to date   Exercise (times per week) 0 times per week   Current Exercises Include No Regular Exercise   Do you need help using the phone?  No   Are you deaf or do you have serious difficulty hearing?  No   Do you need help to go to places out of walking distance? No   Do you need help shopping? No   Do you need help preparing meals?  No   Do you need help with housework?  No   Do you need help with laundry? No   Do you need help taking your medications? No   Do you need help managing money? No   Do you ever drive or ride in a car without wearing a seat belt? No   Have you felt unusual stress, anger or loneliness in the last month? No   Who do you live with? Alone   If you need help, do you have trouble finding someone available to you? No   Have you been bothered in the last four weeks by sexual problems? No   Do you have difficulty concentrating, remembering or making decisions? No           Age-appropriate Screening Schedule:  Refer to the list below for future  screening recommendations based on patient's age, sex and/or medical conditions. Orders for these recommended tests are listed in the plan section. The patient has been provided with a written plan.    Health Maintenance List  Health Maintenance   Topic Date Due    TDAP/TD VACCINES (1 - Tdap) Never done    ZOSTER VACCINE (2 of 3) 05/15/2016    RSV Vaccine - Adults (1 - 1-dose 75+ series) Never done    INFLUENZA VACCINE  Never done    COVID-19 Vaccine (4 - 2024-25 season) 09/01/2024    ANNUAL WELLNESS VISIT  11/13/2024    BMI FOLLOWUP  11/13/2024    LIPID PANEL  11/15/2025    COLORECTAL CANCER SCREENING  05/09/2029    HEPATITIS C SCREENING  Completed    Pneumococcal Vaccine 65+  Completed                                                                                                                                                CMS Preventative Services Quick Reference  Risk Factors Identified During Encounter  Immunizations Discussed/Encouraged: Influenza, Prevnar 20 (Pneumococcal 20-valent conjugate), COVID19, and RSV (Respiratory Syncytial Virus)    The above risks/problems have been discussed with the patient.  Pertinent information has been shared with the patient in the After Visit Summary.  An After Visit Summary and PPPS were made available to the patient.    Follow Up:   Next Medicare Wellness visit to be scheduled in 1 year.         Additional E&M Note during same encounter follows:  Patient has additional, significant, and separately identifiable condition(s)/problem(s) that require work above and beyond the Medicare Wellness Visit     Chief Complaint  Medicare Wellness-subsequent    Subjective   HPI          Hypertension follow-up.  Continues amlodipine, hydralazine, lisinopril.  Blood pressure is excellent.  No cardiovascular symptoms.  No shortness of breath.  Creatinine improved.  Down to 1.34.  Stage IIIa chronic kidney disease.    Prediabetes.  Fasting glucose 131.  Previous nonfasting glucose  "levels not much higher.  A1c down to 6.4%.    Hyperlipidemia the setting of known coronary disease.  He is followed by [facility].  He would like to switch to [employer].  He continues Zetia rosuvastatin and aspirin.    Major depression.  Mostly in remission.  Continues on fluoxetine 20 mg daily.    He had hives around the election.  He thinks it was related to the stress.  Went to the emergency room.  Symptoms resolved.  No recent biotics.  No new foods.  No change in medication.        Objective   Vital Signs:  /87   Pulse 96   Temp 96.9 °F (36.1 °C) (Temporal)   Ht 172.7 cm (68\")   Wt 93.9 kg (207 lb)   SpO2 98%   BMI 31.47 kg/m²   Physical Exam  Vitals and nursing note reviewed.   Constitutional:       General: He is not in acute distress.     Appearance: He is well-developed.   Cardiovascular:      Rate and Rhythm: Normal rate and regular rhythm.      Heart sounds: Normal heart sounds.   Pulmonary:      Effort: Pulmonary effort is normal.      Breath sounds: Normal breath sounds.   Skin:     General: Skin is warm and dry.   Psychiatric:         Mood and Affect: Mood normal.         The following data was reviewed by: Glen Salas MD on 11/25/2024:    Common labs          5/13/2024    12:37 10/29/2024    08:47 10/29/2024    10:12 11/15/2024    12:35   Common Labs   Glucose 115  165  145  131    BUN 14  20  19  13    Creatinine 1.41  1.88  1.89  1.34    Sodium 141  136  137  137    Potassium 4.6  5.2  4.2  4.3    Chloride 102  103  100  100    Calcium 9.2  8.9  9.2  9.3    Total Protein 6.7    6.8    Albumin 4.4  3.8  4.1  4.4    Total Bilirubin 0.6  0.7  0.8  0.5    Alkaline Phosphatase 85  77  84  102    AST (SGOT) 30  38  23  34    ALT (SGPT) 28  23  22  43    WBC  7.69   8.53    Hemoglobin  15.3   14.8    Hematocrit  44.8   43.4    Platelets  221   247    Total Cholesterol 136    148    Triglycerides 138    176    HDL Cholesterol 41    48    LDL Cholesterol  71    70    Hemoglobin A1C 6.50 "    6.40      TSH          10/29/2024    08:47   TSH   TSH 5.070              Assessment and Plan     Annual healthcare maintenance visit/Medicare wellness visit.    Immunizations recommended.    Known coronary artery disease.  He wants to switch from [facility] to [facility] cardiology.  Things have been stable.  Continues aspirin Zetia and rosuvastatin.    Hypertension.  Continue hydralazine lisinopril and amlodipine.  Blood pressure looks good today.    Hyperlipidemia.  Continues Zetia and rosuvastatin.    Prediabetes.  Close to diabetes.  I will see him back in 3 months, not 6 months.  Lab work prior.    Major depression.  In remission.  Continue maintenance fluoxetine.       Renovascular hypertension      Orders:    Hemoglobin A1c; Future    Comprehensive Metabolic Panel; Future    Lipid Panel; Future    CBC & Differential; Future    TSH Rfx On Abnormal To Free T4; Future    Pure hypercholesterolemia       Orders:    Hemoglobin A1c; Future    Comprehensive Metabolic Panel; Future    Lipid Panel; Future    CBC & Differential; Future    TSH Rfx On Abnormal To Free T4; Future    Impaired fasting glucose    Orders:    Hemoglobin A1c; Future    Comprehensive Metabolic Panel; Future    Lipid Panel; Future    CBC & Differential; Future    TSH Rfx On Abnormal To Free T4; Future    Chronic coronary artery disease      Orders:    Hemoglobin A1c; Future    Comprehensive Metabolic Panel; Future    Lipid Panel; Future    CBC & Differential; Future    TSH Rfx On Abnormal To Free T4; Future    Ambulatory Referral to Cardiology    Recurrent major depressive disorder, in partial remission           Medicare annual wellness visit, subsequent                 Follow Up   No follow-ups on file.  Patient was given instructions and counseling regarding his condition or for health maintenance advice. Please see specific information pulled into the AVS if appropriate.

## 2024-11-25 NOTE — ASSESSMENT & PLAN NOTE
Orders:    Hemoglobin A1c; Future    Comprehensive Metabolic Panel; Future    Lipid Panel; Future    CBC & Differential; Future    TSH Rfx On Abnormal To Free T4; Future

## 2025-02-18 ENCOUNTER — TELEPHONE (OUTPATIENT)
Dept: FAMILY MEDICINE CLINIC | Facility: CLINIC | Age: 80
End: 2025-02-18
Payer: MEDICARE

## 2025-02-18 NOTE — TELEPHONE ENCOUNTER
----- Message from Lexis HOOVER sent at 2/18/2025  2:18 PM EST -----  Needs lab appt prior to PCP  ----- Message -----  From: SYSTEM  Sent: 12/11/2024   1:22 AM EST  To: [facility]

## 2025-02-21 RX ORDER — AMLODIPINE BESYLATE 10 MG/1
10 TABLET ORAL DAILY
Qty: 90 TABLET | Refills: 1 | Status: SHIPPED | OUTPATIENT
Start: 2025-02-21

## 2025-02-21 NOTE — TELEPHONE ENCOUNTER
Rx Refill Note  Requested Prescriptions     Pending Prescriptions Disp Refills    amLODIPine (NORVASC) 10 MG tablet [Pharmacy Med Name: amLODIPine Besylate Oral Tablet 10 MG] 90 tablet 1     Sig: TAKE 1 TABLET EVERY DAY      Last office visit with prescribing clinician: 11/25/2024   Last telemedicine visit with prescribing clinician: Visit date not found   Next office visit with prescribing clinician: 5/28/2025                         Would you like a call back once the refill request has been completed: [] Yes [] No    If the office needs to give you a call back, can they leave a voicemail: [] Yes [] No    Francisco Mcclelland  02/21/25, 11:07 EST

## 2025-03-05 RX ORDER — LISINOPRIL 10 MG/1
TABLET ORAL
Qty: 90 TABLET | Refills: 1 | Status: SHIPPED | OUTPATIENT
Start: 2025-03-05

## 2025-03-05 NOTE — TELEPHONE ENCOUNTER
Rx Refill Note  Requested Prescriptions     Pending Prescriptions Disp Refills    lisinopril (PRINIVIL,ZESTRIL) 10 MG tablet [Pharmacy Med Name: Lisinopril Oral Tablet 10 MG] 90 tablet 1     Sig: TAKE 1 TABLET EVERY DAY      Last office visit with prescribing clinician: 11/25/2024   Last telemedicine visit with prescribing clinician: Visit date not found   Next office visit with prescribing clinician: 5/28/2025                         Would you like a call back once the refill request has been completed: [] Yes [] No    If the office needs to give you a call back, can they leave a voicemail: [] Yes [] No    Vivian Carbajal MA  03/05/25, 08:18 EST

## 2025-04-07 NOTE — TELEPHONE ENCOUNTER
Rx Refill Note  Requested Prescriptions     Pending Prescriptions Disp Refills    FLUoxetine (PROzac) 20 MG capsule [Pharmacy Med Name: FLUoxetine HCl Oral Capsule 20 MG] 90 capsule 1     Sig: TAKE 1 CAPSULE EVERY DAY      Last office visit with prescribing clinician: 11/25/2024   Last telemedicine visit with prescribing clinician: Visit date not found   Next office visit with prescribing clinician: 5/28/2025                         Would you like a call back once the refill request has been completed: [] Yes [] No    If the office needs to give you a call back, can they leave a voicemail: [] Yes [] No    Francisco Mcclelland  04/07/25, 14:30 EDT

## 2025-05-09 ENCOUNTER — EXTERNAL PBMM DATA (OUTPATIENT)
Dept: PHARMACY | Facility: OTHER | Age: 80
End: 2025-05-09
Payer: MEDICARE

## 2025-05-28 ENCOUNTER — OFFICE VISIT (OUTPATIENT)
Dept: FAMILY MEDICINE CLINIC | Facility: CLINIC | Age: 80
End: 2025-05-28
Payer: MEDICARE

## 2025-05-28 VITALS
DIASTOLIC BLOOD PRESSURE: 78 MMHG | WEIGHT: 210.9 LBS | HEART RATE: 71 BPM | SYSTOLIC BLOOD PRESSURE: 128 MMHG | RESPIRATION RATE: 17 BRPM | HEIGHT: 68 IN | TEMPERATURE: 97.9 F | OXYGEN SATURATION: 96 % | BODY MASS INDEX: 31.96 KG/M2

## 2025-05-28 DIAGNOSIS — I25.83 CORONARY ARTERY DISEASE DUE TO LIPID RICH PLAQUE: ICD-10-CM

## 2025-05-28 DIAGNOSIS — I70.1 RENAL ARTERY STENOSIS: Chronic | ICD-10-CM

## 2025-05-28 DIAGNOSIS — F33.41 RECURRENT MAJOR DEPRESSIVE DISORDER, IN PARTIAL REMISSION: Chronic | ICD-10-CM

## 2025-05-28 DIAGNOSIS — I15.0 RENOVASCULAR HYPERTENSION: Primary | ICD-10-CM

## 2025-05-28 DIAGNOSIS — R73.01 IMPAIRED FASTING GLUCOSE: Chronic | ICD-10-CM

## 2025-05-28 DIAGNOSIS — I25.10 CORONARY ARTERY DISEASE DUE TO LIPID RICH PLAQUE: ICD-10-CM

## 2025-05-28 DIAGNOSIS — E78.00 PURE HYPERCHOLESTEROLEMIA: Chronic | ICD-10-CM

## 2025-05-28 PROBLEM — I10 HYPERTENSION: Chronic | Status: ACTIVE | Noted: 2023-03-12

## 2025-05-28 RX ORDER — EZETIMIBE 10 MG/1
10 TABLET ORAL DAILY
Qty: 90 TABLET | Refills: 3 | Status: SHIPPED | OUTPATIENT
Start: 2025-05-28

## 2025-05-28 NOTE — PROGRESS NOTES
"Chief Complaint  Hypertension    Subjective        Jatin Gaston presents to [facility]  History of Present Illness    Follow-up hypertension.  Remote renal artery stenosis.  Continues on hydralazine 10 mg twice a day, amlodipine 5 or 10 mg a day, and possibly lisinopril 10 mg a day.  He has a medication list that is 3 years old.  Some these medications are prescribed by a cardiologist in [facility] that he no longer sees.  He states occasionally his legs will feel weak.  Initial blood pressure today is 128 systolic.  Repeat is 140 systolic.  No chest pain.  No chest pressure.  No syncopal episodes.  He had a fall a few months ago.  He states his legs got weak.     Hyperlipidemia.  Continues Zetia and rosuvastatin.  He states the Zetia was stopped by his cardiologist because he did not have an appointment.  And reportedly the cardiologist wants the patient to see me now.  He has history of lipid rich plaque, but no definitive coronary artery stenosis, no stent, no history of MI known.    He does continue low-dose aspirin.    Major depression.  In remission.  Previous panic attacks.  Continues fluoxetine 20 mg daily.        Objective   Vital Signs:  /78   Pulse 71   Temp 97.9 °F (36.6 °C) (Oral)   Resp 17   Ht 172.7 cm (68\")   Wt 95.7 kg (210 lb 14.4 oz)   SpO2 96%   BMI 32.07 kg/m²   Estimated body mass index is 32.07 kg/m² as calculated from the following:    Height as of this encounter: 172.7 cm (68\").    Weight as of this encounter: 95.7 kg (210 lb 14.4 oz).            Physical Exam  Constitutional:       Appearance: Normal appearance.   HENT:      Head: Atraumatic.   Eyes:      Conjunctiva/sclera: Conjunctivae normal.   Neck:      Thyroid: No thyroid mass, thyromegaly or thyroid tenderness.   Cardiovascular:      Rate and Rhythm: Normal rate and regular rhythm.      Pulses: Normal pulses.      Heart sounds: Normal heart sounds.   Pulmonary:      Effort: Pulmonary effort " is normal.      Breath sounds: Normal breath sounds.   Musculoskeletal:         General: Normal range of motion.      Cervical back: Normal range of motion and neck supple. No muscular tenderness.   Lymphadenopathy:      Cervical: No cervical adenopathy.   Skin:     General: Skin is warm and dry.      Findings: No rash.   Neurological:      General: No focal deficit present.      Mental Status: He is alert and oriented to person, place, and time.   Psychiatric:         Mood and Affect: Mood normal.        Result Review :  The following data was reviewed by: Glen Salas MD on 05/28/2025:  Common labs          10/29/2024    08:47 10/29/2024    10:12 11/15/2024    12:35   Common Labs   Glucose 165  145  131    BUN 20  19  13    Creatinine 1.88  1.89  1.34    Sodium 136  137  137    Potassium 5.2  4.2  4.3    Chloride 103  100  100    Calcium 8.9  9.2  9.3    Albumin 3.8  4.1  4.4    Total Bilirubin 0.7  0.8  0.5    Alkaline Phosphatase 77  84  102    AST (SGOT) 38  23  34    ALT (SGPT) 23  22  43    WBC 7.69   8.53    Hemoglobin 15.3   14.8    Hematocrit 44.8   43.4    Platelets 221   247    Total Cholesterol   148    Triglycerides   176    HDL Cholesterol   48    LDL Cholesterol    70    Hemoglobin A1C   6.40      TSH          10/29/2024    08:47   TSH   TSH 5.070                Assessment and Plan   Diagnoses and all orders for this visit:    1. Renovascular hypertension (Primary)  -     CBC & Differential  -     Comprehensive Metabolic Panel  -     Lipid Panel  -     Hemoglobin A1c  -     TSH+Free T4    2. Pure hypercholesterolemia  -     CBC & Differential  -     Comprehensive Metabolic Panel  -     Lipid Panel  -     Hemoglobin A1c  -     TSH+Free T4    3. Coronary artery disease due to lipid rich plaque  -     CBC & Differential  -     Comprehensive Metabolic Panel  -     Lipid Panel  -     Hemoglobin A1c  -     TSH+Free T4    4. Renal artery stenosis  -     CBC & Differential  -     Comprehensive Metabolic  Panel  -     Lipid Panel  -     Hemoglobin A1c  -     TSH+Free T4    5. Impaired fasting glucose  -     CBC & Differential  -     Comprehensive Metabolic Panel  -     Lipid Panel  -     Hemoglobin A1c  -     TSH+Free T4    6. Recurrent major depressive disorder, in partial remission  -     CBC & Differential  -     Comprehensive Metabolic Panel  -     Lipid Panel  -     Hemoglobin A1c  -     TSH+Free T4    Other orders  -     ezetimibe (Zetia) 10 MG tablet; Take 1 tablet by mouth Daily.  Dispense: 90 tablet; Refill: 3    Hypertension.  Reported to remote renal artery stenosis.  The medication list is not reconciled.  He is likely taking hydralazine twice a day, the lisinopril, and amlodipine either one half or 1 tablet a day.  He is going to let us know what his medications are.  I want him to bring in all the bottles at next visit.  He is also going to be checking his blood pressure on a regular basis for the next few weeks and sending us the numbers.  He is not on the [facility] system, I told him to call us with the average.    Hyperlipidemia with history of subcritical renal artery stenosis and also coronary artery disease.  No previous stent or infarction.  Checking labs today including creatinine.  Continue Zetia and rosuvastatin.    Impaired fasting glucose.  Checking A1c.    Major depression and panic attacks.  In remission.  Continues maintenance fluoxetine.  Follow-up as above and 6 months for annual Medicare wellness visit.         Follow Up   Return in about 6 months (around 11/28/2025) for Subsequent Medicare Wellness Visit, Lab Visit One Week Prior to Next Appointment, lab work today.  Patient was given instructions and counseling regarding his condition or for health maintenance advice. Please see specific information pulled into the AVS if appropriate.

## 2025-05-29 LAB
ALBUMIN SERPL-MCNC: 4.5 G/DL (ref 3.8–4.8)
ALP SERPL-CCNC: 104 IU/L (ref 44–121)
ALT SERPL-CCNC: 18 IU/L (ref 0–44)
AST SERPL-CCNC: 24 IU/L (ref 0–40)
BASOPHILS # BLD AUTO: 0 X10E3/UL (ref 0–0.2)
BASOPHILS NFR BLD AUTO: 1 %
BILIRUB SERPL-MCNC: 0.5 MG/DL (ref 0–1.2)
BUN SERPL-MCNC: 11 MG/DL (ref 8–27)
BUN/CREAT SERPL: 8 (ref 10–24)
CALCIUM SERPL-MCNC: 9.3 MG/DL (ref 8.6–10.2)
CHLORIDE SERPL-SCNC: 103 MMOL/L (ref 96–106)
CHOLEST SERPL-MCNC: 179 MG/DL (ref 100–199)
CO2 SERPL-SCNC: 20 MMOL/L (ref 20–29)
CREAT SERPL-MCNC: 1.33 MG/DL (ref 0.76–1.27)
EGFRCR SERPLBLD CKD-EPI 2021: 55 ML/MIN/1.73
EOSINOPHIL # BLD AUTO: 0.1 X10E3/UL (ref 0–0.4)
EOSINOPHIL NFR BLD AUTO: 1 %
ERYTHROCYTE [DISTWIDTH] IN BLOOD BY AUTOMATED COUNT: 12 % (ref 11.6–15.4)
GLOBULIN SER CALC-MCNC: 2.4 G/DL (ref 1.5–4.5)
GLUCOSE SERPL-MCNC: 112 MG/DL (ref 70–99)
HBA1C MFR BLD: 6.4 % (ref 4.8–5.6)
HCT VFR BLD AUTO: 45.2 % (ref 37.5–51)
HDLC SERPL-MCNC: 45 MG/DL
HGB BLD-MCNC: 15 G/DL (ref 13–17.7)
IMM GRANULOCYTES # BLD AUTO: 0 X10E3/UL (ref 0–0.1)
IMM GRANULOCYTES NFR BLD AUTO: 0 %
LDLC SERPL CALC-MCNC: 104 MG/DL (ref 0–99)
LYMPHOCYTES # BLD AUTO: 0.7 X10E3/UL (ref 0.7–3.1)
LYMPHOCYTES NFR BLD AUTO: 12 %
MCH RBC QN AUTO: 31.4 PG (ref 26.6–33)
MCHC RBC AUTO-ENTMCNC: 33.2 G/DL (ref 31.5–35.7)
MCV RBC AUTO: 95 FL (ref 79–97)
MONOCYTES # BLD AUTO: 0.4 X10E3/UL (ref 0.1–0.9)
MONOCYTES NFR BLD AUTO: 7 %
NEUTROPHILS # BLD AUTO: 4.7 X10E3/UL (ref 1.4–7)
NEUTROPHILS NFR BLD AUTO: 79 %
PLATELET # BLD AUTO: 231 X10E3/UL (ref 150–450)
POTASSIUM SERPL-SCNC: 4.4 MMOL/L (ref 3.5–5.2)
PROT SERPL-MCNC: 6.9 G/DL (ref 6–8.5)
RBC # BLD AUTO: 4.78 X10E6/UL (ref 4.14–5.8)
SODIUM SERPL-SCNC: 140 MMOL/L (ref 134–144)
T4 FREE SERPL-MCNC: 1.32 NG/DL (ref 0.82–1.77)
TRIGL SERPL-MCNC: 174 MG/DL (ref 0–149)
TSH SERPL DL<=0.005 MIU/L-ACNC: 2.6 UIU/ML (ref 0.45–4.5)
VLDLC SERPL CALC-MCNC: 30 MG/DL (ref 5–40)
WBC # BLD AUTO: 6 X10E3/UL (ref 3.4–10.8)

## 2025-06-24 ENCOUNTER — TELEPHONE (OUTPATIENT)
Dept: FAMILY MEDICINE CLINIC | Facility: CLINIC | Age: 80
End: 2025-06-24
Payer: MEDICARE

## 2025-06-24 NOTE — TELEPHONE ENCOUNTER
[facility] [employer] called and stated have sent over fax pt verification form. Checked in media, nothing located. Informed to fax again.

## 2025-08-11 RX ORDER — AMLODIPINE BESYLATE 10 MG/1
10 TABLET ORAL DAILY
Qty: 90 TABLET | Refills: 1 | Status: SHIPPED | OUTPATIENT
Start: 2025-08-11